# Patient Record
Sex: MALE | Race: WHITE | Employment: OTHER | URBAN - METROPOLITAN AREA
[De-identification: names, ages, dates, MRNs, and addresses within clinical notes are randomized per-mention and may not be internally consistent; named-entity substitution may affect disease eponyms.]

---

## 2017-04-26 ENCOUNTER — HOSPITAL ENCOUNTER (OUTPATIENT)
Dept: PHYSICAL THERAPY | Age: 62
Discharge: HOME | End: 2017-04-26
Payer: COMMERCIAL

## 2017-04-26 PROCEDURE — 97162 PT EVAL MOD COMPLEX 30 MIN: CPT

## 2017-04-26 PROCEDURE — 97016 VASOPNEUMATIC DEVICE THERAPY: CPT

## 2017-04-26 PROCEDURE — 97140 MANUAL THERAPY 1/> REGIONS: CPT

## 2017-04-26 PROCEDURE — 97110 THERAPEUTIC EXERCISES: CPT

## 2017-04-26 NOTE — PROGRESS NOTES
[address] [facility] PHYSICAL THERAPY AT [facility]   [address] [facility] [address] [facility], [place] [address]  Phone: [phone] Fax: [phone] / [address] [place] [address] PHYSICAL THERAPY SERVICES  Patient Name: Janice Godfrey : 1955   Medical   Diagnosis: Acute foot pain, right [M79.671] Treatment Diagnosis: R plantar fasciitis    Onset Date: Dec 2016      Referral Source: Abdelrahman Guy MD Start of Care [facility]): 2017   Prior Hospitalization: See medical history Provider #: [id number]   Prior Level of Function: Functional I - with multiple Musculoskeletal co morbidities    Comorbidities: LS sx, L calcaneal ORIF, RCR    Medications: Verified on Patient Summary List   The Plan of Care and following information is based on the information from the initial evaluation.   ========================================================================  Assessment / key information:  Pt is a 65y.o. year old male who presents with co R heel and foot pain starting in Dec 2016 of unknown reason, but possible ladder use. Pain is progressively worsening. Patient uses arch support and NSAIDs 2x per day for pain relief. Patient attempted to take 3 days off in attempts to alleviate pain, but with no change. Patient has hx LS discectomy in  and L heel ORIF in . .  Current deficits include: increased pain to 10/10 at worst, decreased poor gait quality with decreased heel contact and lateral foot compensation, decreased AROM DF to -1, poor myofascial mobility of posterior chain gastroc, solues and HS, static rear foot IV posturing and 5th met PF, TTP PF Functional deficits include: walking with pain - pushes through pain, work duties: ladder use, Yazidism related activities, rec activities: fishing/boating, walking barefoot . Home exercise program initiated on initial evaluation to address these deficits.  Pt would benefit from PT to address these deficits for increased functional mobility and QOL.  ========================================================================  Eval Complexity: History: HIGH Complexity :3+ comorbidities / personal factors will impact the outcome/ POC Exam:HIGH Complexity : 4+ Standardized tests and measures addressing body structure, function, activity limitation and / or participation in recreation  Presentation: MEDIUM Complexity : Evolving with changing characteristics  Clinical Decision Making:MEDIUM Complexity : FOTO score of 26-74Overall Complexity:MEDIUM  Problem List: pain affecting function, decrease ROM, decrease strength, edema affecting function, impaired gait/ balance, decrease ADL/ functional abilitiies, decrease activity tolerance, decrease flexibility/ joint mobility and other FOTO 48/100   Treatment Plan may include any combination of the following: Therapeutic exercise, Therapeutic activities, Neuromuscular re-education, Physical agent/modality, Gait/balance training, Manual therapy, Aquatic therapy, Patient education, Self Care training, Functional mobility training and Home safety training  Patient / Family readiness to learn indicated by: asking questions, trying to perform skills and interest  Persons(s) to be included in education: patient (P)  Barriers to Learning/Limitations: None  Measures taken:    Patient Goal (s): \"ease pain level \"   Patient self reported health status: excellent  Rehabilitation Potential: good   Short Term Goals: To be accomplished in  1  weeks:  1. Pt will be independent and compliant with HEP   Long Term Goals: To be accomplished in  8-12  treatments:  1. Patient will increase FOTO score to 60/100 for indications of increased functional mobility. 2.  Patient will demo improved gait quality to include proper heel strike and step length for progression to dec compensatory patterns   3. Patient will demo increased AROM DF to 5 deg for progression to normalized gait pattern with community mobility   4.  Patient will demo less than 10 deg rearfoot IV for improved functional mobility of the foot with ADLs / standinf   Frequency / Duration:   Patient to be seen  2  times per week for 8-12  treatments:  Patient / Caregiver education and instruction: self care, activity modification and exercises  G-Codes (GP): ANÍBAL  Therapist Signature: Luis Longoria PT Date: 0/23/6239   Certification Period: na Time: 6:35 AM   ========================================================================  I certify that the above Physical Therapy Services are being furnished while the patient is under my care. I agree with the treatment plan and certify that this therapy is necessary. Physician Signature:        Date:       Time:   Please sign and return to [facility] or you may fax the signed copy to [phone]. Thank you.

## 2017-04-26 NOTE — PROGRESS NOTES
PTANKLE EVAL AND TREATMENT     Patient Name: Horacio Souza  Date:2017  : 1955  [x]  Patient  Verified  Payor: Gerda Preciado / Plan: Tyler Dumont / Product Type: HMO /    In time:902  Out time:950  Total Treatment Time (min): 48  Visit #: 1 of -12    Treatment Area: Acute foot pain, right [M79.671]    SUBJECTIVE  Pain Level (0-10 scale): (C): 6 (B): 4  (W):  10  Any medication changes, allergies to medications, diagnosis change, or new procedure performed?: see summary sheet for update  Subjective functional status/changes:   [] No changes reported  Chief complaint: Patient reports with co R heel and foot pain starting in Dec 2016 of unknown reason, but possible ladder use. Pain is progressively worsening. Patient uses arch support and NSAIDs 2x per day for pain relief. Patient attempted to take 3 days off in attempts to alleviate pain, but with no change. Patient has hx LS discectomy in  and L heel ORIF in .      Deficits: walking with pain - pushes through pain, work duties: ladder use,  Shinto related activities, rec activities: fishing/boating, walking barefoot    Physical Therapy Evaluation  - Foot and Ankle    Gait: antalgic , decreased heel strike , lateral foot contact to avoid heel pressure     ROM/Strength        AROM            Strength (1-5)   Left (HEEL ORIF) Right Left  Right   Dorsiflexion 9 -1 5 5   Plantarflexion WNL WNL      Inversion 28 34     Eversion 18 22     Great Toe Ext PROM WNL  PROM WNL NA NA   Great Toe Flex NA NA NA NA     Flexibility:   Gastroc: R tightness   Soleus: R tightness   HS - 90/90 HS test B tightness     Palpation: TTP PF     Optional Tests: Patient reports hx of poor balance since  (foot surgery)  SLS  Unable     Swelling: none noted     Other tests/ comments:  Severe calc / rear foot IV 16 deg static posture   5th met PF     OBJECTIVE   Manual - 10 min  DTM to PF and ankle IV, PROM into ankle EV and EV mobs, Gastroc stretch     Modality (rationale): decrease acute inflammation  [x]  VASO 10 min     Patient Education: [x] Established HEP    [x] POT   (minutes) : 8    Pain Level (0-10 scale) post treatment: 6    ASSESSMENT  [x]  See Plan of Care    PLAN  [x]  Upgrade activities as tolerated     [x] Other:_  POC 2x8-12    Malekristi Myron, PT 4/26/2017  6:35 AM    Justification for Eval Code Complexity:  Patient History : multiple musculoskeletal co morbidities including LS sx, L calcaneal ORIF, chronicity , unwillingness to rest  Examination see exam   Clinical Presentation: unstable sec to elevated pain levels for 4 mo   Clinical Decision Making : FOTO : 48 /100

## 2017-05-02 ENCOUNTER — HOSPITAL ENCOUNTER (OUTPATIENT)
Dept: PHYSICAL THERAPY | Age: 62
Discharge: HOME | End: 2017-05-02
Payer: COMMERCIAL

## 2017-05-02 PROCEDURE — 97140 MANUAL THERAPY 1/> REGIONS: CPT

## 2017-05-02 PROCEDURE — 97016 VASOPNEUMATIC DEVICE THERAPY: CPT

## 2017-05-02 PROCEDURE — 97110 THERAPEUTIC EXERCISES: CPT

## 2017-05-02 NOTE — PROGRESS NOTES
PT DAILY TREATMENT NOTE     Patient Name: Yuniel Spears  Date:2017  : 1955  [x]  Patient  Verified  Payor: Clotilda Meckel / Plan: Gerardo Petersen / Product Type: HMO /    In time: 8:00am     Out time: 9:00am  Total Treatment Time (min): 60   Visit #: 2 of     Treatment Area: Acute foot pain, right [M79.671]    SUBJECTIVE  Pain Level (0-10 scale): 6  Any medication changes, allergies to medications, adverse drug reactions, diagnosis change, or new procedure performed?: [x] No    [] Yes (see summary sheet for update)  Subjective functional status/changes:   [] No changes reported  \"I have been doing my exercises. I can't walk barefoot. \"    OBJECTIVE  Modality rationale: decrease edema, decrease inflammation and decrease pain to improve the patients ability to perform ADLs   Min Type Additional Details    [] Estim: []Att   []Unatt        []TENS instruct                  []IFC  []Premod   []NMES                     []Other:  []w/US   []w/ice   []w/heat  Position:  Location:    []  Traction: [] Cervical       []Lumbar                       [] Prone          []Supine                       []Intermittent   []Continuous Lbs:  [] before manual  [] after manual    []  Ultrasound: []Continuous   [] Pulsed                           []1MHz   []3MHz Location:  W/cm2:    []  Iontophoresis with dexamethasone         Location: [] Take home patch   [] In clinic    []  Ice     []  heat  []  Ice massage Position:  Location:   10 [x]  Vasopneumatic Device Pressure:       [] lo [x] med [] hi   Temperature: [x] lo [] med [] hi   [x] Skin assessment post-treatment:  [x]intact []redness- no adverse reaction       []redness  adverse reaction:     35 min Therapeutic Exercise:  [x] See flow sheet: initiated therex per IE   Rationale: increase ROM and increase strength to improve the patients ability to return to work duties, climbing ladders    15 min Manual Therapy:  prone DTM/TPR to [address], post-tib, and peroneals; (R) ankle/foot PROM in all directions; manual gastroc stretch   Rationale: decrease pain, increase ROM, increase tissue extensibility and decrease trigger points to improve patient's mobility for a normalized gait pattern          X min Patient Education: [x] Review HEP from IE; sent dry needling request to MD     Other Objective/Functional Measures:    (R) ankle AROM DF with knee extended: 12deg    Pain Level (0-10 scale) post treatment: 0 at rest    ASSESSMENT/Changes in Function:   Patient demo significant improvement in post chain extensibility and inc DF from -1deg at IE to 12deg today. Patient's c/c is cont'd arch pain with (R) LE WB. Patient inquired about use of dry needling to address persistent GSC and posterior tib TrPs. Educated patient that dry needling to posterior chain may be beneficial in reducing tension and encouraged patient to perform ice massage to plantar surface of foot if edema returns with activity. Patient will continue to benefit from skilled PT services to modify and progress therapeutic interventions, address functional mobility deficits, address ROM deficits, address strength deficits, analyze and address soft tissue restrictions, analyze and cue movement patterns and analyze and modify body mechanics/ergonomics to attain remaining goals. [x]  See Plan of Care  []  See progress note/recertification  []  See Discharge Summary         Progress towards goals / Updated goals:  Short Term Goals: To be accomplished in 1 weeks:  1. Pt will be independent and compliant with HEP. -Goal met; strict adherence reported (5/2/17)  Long Term Goals: To be accomplished in 8-12 treatments:  1. Patient will increase FOTO score to 60/100 for indications of increased functional mobility. 2. Patient will demo improved gait quality to include proper heel strike and step length for progression to dec compensatory patterns.  -Goal progressing; rear foot IV posturing continues to to reduce compression/pain on the medial arch (5/2/17)  3. Patient will demo increased AROM DF to 5 deg for progression to normalized gait pattern with community mobility   4. Patient will demo less than 10 deg rearfoot IV for improved functional mobility of the foot with ADLs / standing.     PLAN  [x]  Upgrade activities as tolerated     [x]  Continue plan of care  []  Update interventions per flow sheet       []  Discharge due to:_  [x]  Other: add strap gastroc stretch with EV, foot roller, seated heel slides and seated [address] Susan Bond, \[facility]\"" 5/2/2017

## 2017-05-04 ENCOUNTER — HOSPITAL ENCOUNTER (OUTPATIENT)
Dept: PHYSICAL THERAPY | Age: 62
Discharge: HOME | End: 2017-05-04
Payer: COMMERCIAL

## 2017-05-04 PROCEDURE — 97140 MANUAL THERAPY 1/> REGIONS: CPT

## 2017-05-04 PROCEDURE — 97110 THERAPEUTIC EXERCISES: CPT

## 2017-05-04 PROCEDURE — 97016 VASOPNEUMATIC DEVICE THERAPY: CPT

## 2017-05-04 NOTE — PROGRESS NOTES
PT DAILY TREATMENT NOTE     Patient Name: Ash Hart  Date:2017  : 1955  [x]  Patient  Verified  Payor: Percy Barber / Plan: Cary Valverde / Product Type: HMO /    In time: 9:00am     Out time: 10:12am  Total Treatment Time (min): 72  Visit #: 3 of     Treatment Area: Acute foot pain, right [M79.671]    SUBJECTIVE  Pain Level (0-10 scale): 4  Any medication changes, allergies to medications, adverse drug reactions, diagnosis change, or new procedure performed?: [x] No    [] Yes (see summary sheet for update)  Subjective functional status/changes:   [] No changes reported  \"I am doing okay. I felt it a little at around 2pm last time but I know it was just sore. \"    OBJECTIVE  Modality rationale: decrease edema, decrease inflammation and decrease pain to improve the patients ability to perform ADLs   Min Type Additional Details    [] Estim: []Att   []Unatt        []TENS instruct                  []IFC  []Premod   []NMES                     []Other:  []w/US   []w/ice   []w/heat  Position:  Location:    []  Traction: [] Cervical       []Lumbar                       [] Prone          []Supine                       []Intermittent   []Continuous Lbs:  [] before manual  [] after manual    []  Ultrasound: []Continuous   [] Pulsed                           []1MHz   []3MHz Location:  W/cm2:    []  Iontophoresis with dexamethasone         Location: [] Take home patch   [] In clinic    []  Ice     []  heat  []  Ice massage Position:  Location:   10 [x]  Vasopneumatic Device Pressure:       [] lo [x] med [] hi   Temperature: [x] lo [] med [] hi   [x] Skin assessment post-treatment:  [x]intact []redness- no adverse reaction       []redness  adverse reaction:     39 min Therapeutic Exercise:  [x] See flow sheet: added foot roller   Rationale: increase ROM and increase strength to improve the patients ability to return to work duties, climbing ladders    23 min Manual Therapy:  prone DTM/TPR to [employer], post-tib, and peroneals; (R) ankle/foot PROM in all directions; manual gastroc stretch; 1st-5th ray mobilization   Rationale: decrease pain, increase ROM, increase tissue extensibility and decrease trigger points to improve patient's mobility for a normalized gait pattern          X min Patient Education: [x] Review HEP and purpose for therapeutic interventions; added MSR bunion EO to therex and refaxed dry needling script     Other Objective/Functional Measures:    (R) ankle AROM with knee extended: DF 13deg, IV 20, EV 10, PF 60   Difficulty noted with toe yoga - patient encouraged to cont and expressed understanding of importance of this task to improve strength of intrinsic musculature plantar surface of foot. Pain Level (0-10 scale) post treatment: 0    ASSESSMENT/Changes in Function:   Patient reports last treatment caused normal post-therex soreness. Improved mobility into all directions, will assess whether inc manual time improves carryover with ankle mobility. Significant TrPs and inc in tissue density along medial gastroc and soleus complex limiting ankle EV - able to improve this today. Patient reports difficulty with balance and apprehensive with addition of EO MSR bunion to therex, but able to complete task with ease; patient appropriate for progression towards GT or SR. Improved foot alignment with even positioning of MTHs to heel, however, patient presents with high arch due to prolonged use of arch support. Patient will continue to benefit from skilled PT services to modify and progress therapeutic interventions, address functional mobility deficits, address ROM deficits, address strength deficits, analyze and address soft tissue restrictions, analyze and cue movement patterns and analyze and modify body mechanics/ergonomics to attain remaining goals.      [x]  See Plan of Care  []  See progress note/recertification  []  See Discharge Summary         Progress towards goals / Updated goals:  Short Term Goals: To be accomplished in 1 weeks:  1. Pt will be independent and compliant with HEP. -Goal met; strict adherence reported (5/2/17)  Long Term Goals: To be accomplished in 8-12 treatments:  1. Patient will increase FOTO score to 60/100 for indications of increased functional mobility. 2. Patient will demo improved gait quality to include proper heel strike and step length for progression to dec compensatory patterns. -Goal progressing; rear foot IV posturing continues to to reduce compression/pain on the medial arch (5/2/17)  3. Patient will demo increased AROM DF to 5 deg for progression to normalized gait pattern with community mobility   4. Patient will demo less than 10 deg rearfoot IV for improved functional mobility of the foot with ADLs / standing.     PLAN  [x]  Upgrade activities as tolerated     [x]  Continue plan of care  []  Update interventions per flow sheet       []  Discharge due to:_  [x]  Other: add strap gastroc stretch with EV, seated heel slides and seated [address] [facility], PTA 5/4/2017

## 2017-05-08 ENCOUNTER — HOSPITAL ENCOUNTER (OUTPATIENT)
Dept: PHYSICAL THERAPY | Age: 62
Discharge: HOME | End: 2017-05-08
Payer: COMMERCIAL

## 2017-05-08 PROCEDURE — 97140 MANUAL THERAPY 1/> REGIONS: CPT

## 2017-05-08 PROCEDURE — 97110 THERAPEUTIC EXERCISES: CPT

## 2017-05-08 PROCEDURE — 97016 VASOPNEUMATIC DEVICE THERAPY: CPT

## 2017-05-08 NOTE — PROGRESS NOTES
PT DAILY TREATMENT NOTE     Patient Name: Elida Blackburn  Date:2017  : 1955  [x]  Patient  Verified  Payor: Joe Torres / Plan: Ephriam Haw / Product Type: HMO /    In time:800  Out time: 187  Total Treatment Time (min): 75  Visit #: 4 of     Treatment Area: Acute foot pain, right [M79.671]    SUBJECTIVE  Pain Level (0-10 scale): 4-5  Any medication changes, allergies to medications, adverse drug reactions, diagnosis change, or new procedure performed?: [x] No    [] Yes (see summary sheet for update)  Subjective functional status/changes:   [] No changes reported  \"I think I am making small improvements, but can I get a copy of that note to take to my doctor. \" - patient referring to IMT note     OBJECTIVE  Modality rationale: decrease edema, decrease inflammation and decrease pain to improve the patients ability to perform ADLs   Min Type Additional Details    [] Estim: []Att   []Unatt        []TENS instruct                  []IFC  []Premod   []NMES                     []Other:  []w/US   []w/ice   []w/heat  Position:  Location:    []  Traction: [] Cervical       []Lumbar                       [] Prone          []Supine                       []Intermittent   []Continuous Lbs:  [] before manual  [] after manual    []  Ultrasound: []Continuous   [] Pulsed                           []1MHz   []3MHz Location:  W/cm2:    []  Iontophoresis with dexamethasone         Location: [] Take home patch   [] In clinic    []  Ice     []  heat  []  Ice massage Position:  Location:   10 [x]  Vasopneumatic Device Pressure:       [] lo [x] med [] hi   Temperature: [x] lo [] med [] hi   [x] Skin assessment post-treatment:  [x]intact []redness- no adverse reaction       []redness  adverse reaction:     51 min Therapeutic Exercise:  [x] See flow sheet:    Rationale: increase ROM and increase strength to improve the patients ability to return to work duties, climbing ladders    14 min Manual Therapy:  prone DTM/TPR to [address], post-tib, and peroneals; (R) ankle/foot PROM in all directions, Plantar fascia DTM    Rationale: decrease pain, increase ROM, increase tissue extensibility and decrease trigger points to improve patient's mobility for a normalized gait pattern          X min Patient Education: [x] Review HEP and purpose for therapeutic interventions; added MSR bunion EO to therex and refaxed dry needling script     Other Objective/Functional Measures:    Progressed program to include strap stretch with EV, seated heel slides and seated BAPS for functional ROM     Pain Level (0-10 scale) post treatment: 0    ASSESSMENT/Changes in Function: Patient tolerated all treatment progression well without adverse reaction. Good PROM noted with heel slides into DF. Some dyskinesia with BAPS but improved with practice. Patient cont to with lateral [address] tightness palpated with manual.  Still pending return for verbal order for IMT. Patient will continue to benefit from skilled PT services to modify and progress therapeutic interventions, address functional mobility deficits, address ROM deficits, address strength deficits, analyze and address soft tissue restrictions, analyze and cue movement patterns and analyze and modify body mechanics/ergonomics to attain remaining goals. [x]  See Plan of Care  []  See progress note/recertification  []  See Discharge Summary         Progress towards goals / Updated goals: All goals reviewed and progressing appropriately as of 5/8/2017  Short Term Goals: To be accomplished in 1 weeks:  1. Pt will be independent and compliant with HEP. -Goal met; strict adherence reported (5/2/17)  Long Term Goals: To be accomplished in 8-12 treatments:  1. Patient will increase FOTO score to 60/100 for indications of increased functional mobility. 2. Patient will demo improved gait quality to include proper heel strike and step length for progression to dec compensatory patterns.  -Goal progressing; rear foot IV posturing continues to to reduce compression/pain on the medial arch (5/2/17)  3. Patient will demo increased AROM DF to 5 deg for progression to normalized gait pattern with community mobility   4. Patient will demo less than 10 deg rearfoot IV for improved functional mobility of the foot with ADLs / standing.     PLAN  [x]  Upgrade activities as tolerated     [x]  Continue plan of care  []  Update interventions per flow sheet       []  Discharge due to:_  [x]  Other: pending IMT note - patient request a copy to physically take to MD himself      Evangelista Olson, PT 5/8/2017

## 2017-05-11 ENCOUNTER — APPOINTMENT (OUTPATIENT)
Dept: PHYSICAL THERAPY | Age: 62
End: 2017-05-11
Payer: COMMERCIAL

## 2017-05-15 ENCOUNTER — HOSPITAL ENCOUNTER (OUTPATIENT)
Dept: PHYSICAL THERAPY | Age: 62
Discharge: HOME | End: 2017-05-15
Payer: COMMERCIAL

## 2017-05-15 PROCEDURE — 97016 VASOPNEUMATIC DEVICE THERAPY: CPT

## 2017-05-15 PROCEDURE — 97140 MANUAL THERAPY 1/> REGIONS: CPT

## 2017-05-15 PROCEDURE — 97110 THERAPEUTIC EXERCISES: CPT

## 2017-05-15 NOTE — PROGRESS NOTES
PT DAILY TREATMENT NOTE     Patient Name: Christian Waite  Date:5/15/2017  : 1955  [x]  Patient  Verified  Payor: Jon Montilla / Plan: Thomas Acevedo / Product Type: HMO /    In time: 8:07 Out time:9:16  Total Treatment Time (min): 69  Total Timed Codes (min): 59  1:1 Treatment Time (min): 59   Visit #: 5 of     Treatment Area: Acute foot pain, right [M79.671]    SUBJECTIVE  Pain Level (0-10 scale): 5  Any medication changes, allergies to medications, adverse drug reactions, diagnosis change, or new procedure performed?: [x] No    [] Yes (see summary sheet for update)  Subjective functional status/changes:   [] No changes reported  Pt arrives 7 minutes late, antalgic gait. Agrees to receive IMT today     OBJECTIVE  Modality rationale: decrease edema, decrease inflammation and decrease pain to improve the patients ability to improve mobility for ambulation, stairs, sport    Min Type Additional Details    [] Estim: []Att   []Unatt        []TENS instruct                  []IFC  []Premod   []NMES                     []Other:  []w/US   []w/ice   []w/heat  Position:  Location:    []  Traction: [] Cervical       []Lumbar                       [] Prone          []Supine                       []Intermittent   []Continuous Lbs:  [] before manual  [] after manual    []  Ultrasound: []Continuous   [] Pulsed                           []1MHz   []3MHz Location:  W/cm2:    []  Iontophoresis with dexamethasone         Location: [] Take home patch   [] In clinic   10 vasopneumatic device, med pressure, low temp  Position:  Location:   [] Skin assessment post-treatment:  []intact []redness- no adverse reaction       []redness  adverse reaction:       Dry Needling Procedure Note    Dry Needle Session Number:  1    Procedure:    An intramuscular manual therapy (dry needling) and a neuro-muscular re-education treatment was done to deactivate myofascial trigger points, with a 15/30 gauge solid filament needle, under aseptic technique. Indication(s): [x] Muscle spasms [x] Myalgia/Myositis  [x] Muscle cramps      [x] Muscle imbalances [] TMD (TMJ) [x] Myofascial pain & dysfunction     [] Other: __    Informed Consent Obtained: [x] Verbal  [x] Written    The following items were reviewed with the patient:   Purpose of dry needling, side effects, possible complications, and the informed consent    The need to report the use of blood thinners and/or immunosuppressant medications    How to respond to possible adverse effects of the treatment   Self treatment of post needling soreness: heat (moist heat, heat wraps) and stretching   Opportunity was given to ask any questions, all questions were answered    Time Out Performed to verify correct body part and confirm consent for treatment: 5/15/2017 Time: 8:11    Treatment:  The following muscles were treated today:      Right: Gastroc, soleus, FDL, FDB, Quadratus plantae, adbductor hallucis, abd digiti minimi    Left:      Patients response to todays treatment:   [x]  LTRs  [x]  Muscle Relaxation  []  Pain Relief  []  Decreased Tinnitus  []  Decreased HAs [x]  Post needling soreness [x]  Increased ROM of great toe flexion    []  Other:        40 min Therapeutic Exercise:  [x] See flow sheet :   Rationale: increase ROM, increase strength, improve coordination and improve balance to improve the patients ability to improve gait, stairs, mobility       19 min Manual Therapy:  IMT, see above. DTm to mm needled.  Gastroc/soleus stretch    Rationale: decrease pain, increase ROM, increase tissue extensibility and decrease trigger points to improve gait, pain with work     x min Patient Education: [x] Review HEP    [] Progressed/Changed HEP based on:   [] positioning   [] body mechanics   [] transfers   [] heat/ice application       Post-needling instructions      Other Objective/Functional Measures:     Pt has approx 5 degrees ankle DF prior to treatment with knee flexed   Discussed postural changes. Pt's heel is worse with sitting slumped, improved with upright posture. Discussed use of lumbar roll in the car and while seated. Lifting mechanics. Pain Level (0-10 scale) post treatment: 0    ASSESSMENT/Changes in Function: improvements in gastroc soleus length. Pt demo's improvements in great toe flexion following IMT. Good response to first session of IMT. Patient will continue to benefit from skilled PT services to modify and progress therapeutic interventions, address functional mobility deficits, address ROM deficits, address strength deficits, analyze and address soft tissue restrictions, analyze and cue movement patterns, analyze and modify body mechanics/ergonomics, assess and modify postural abnormalities, address imbalance/dizziness and instruct in home and community integration to attain remaining goals. []  See Plan of Care  []  See progress note/recertification  []  See Discharge Summary         Progress towards goals / Updated goals:  Short Term Goals: To be accomplished in 1 weeks:  1. Pt will be independent and compliant with HEP. -Goal met; strict adherence reported (5/2/17)  Long Term Goals: To be accomplished in 8-12 treatments:  1. Patient will increase FOTO score to 60/100 for indications of increased functional mobility. 2. Patient will demo improved gait quality to include proper heel strike and step length for progression to dec compensatory patterns. -Goal progressing; rear foot IV posturing continues to to reduce compression/pain on the medial arch (5/2/17); con't antalgic gait (5/15/17)  3. Patient will demo increased AROM DF to 5 deg for progression to normalized gait pattern with community mobility  Goal progressing at AROM 5 degrees with knee flexion prior to treatment (5/15/17)  4. Patient will demo less than 10 deg rearfoot IV for improved functional mobility of the foot with ADLs / standing.     PLAN  [x]  Upgrade activities as tolerated     [] Continue plan of care  []  Update interventions per flow sheet       []  Discharge due to:_  [x]  Other:_assess response to first session of IMT      Katiana Hoyos DPT 5/15/2017  7:39 AM

## 2017-05-18 ENCOUNTER — APPOINTMENT (OUTPATIENT)
Dept: PHYSICAL THERAPY | Age: 62
End: 2017-05-18
Payer: COMMERCIAL

## 2017-05-19 ENCOUNTER — HOSPITAL ENCOUNTER (OUTPATIENT)
Dept: PHYSICAL THERAPY | Age: 62
Discharge: HOME | End: 2017-05-19
Payer: COMMERCIAL

## 2017-05-19 PROCEDURE — 97140 MANUAL THERAPY 1/> REGIONS: CPT

## 2017-05-19 PROCEDURE — 97016 VASOPNEUMATIC DEVICE THERAPY: CPT

## 2017-05-19 PROCEDURE — 97110 THERAPEUTIC EXERCISES: CPT

## 2017-05-19 NOTE — PROGRESS NOTES
PT DAILY TREATMENT NOTE     Patient Name: Maple Seip  Date:2017  : 1955  [x]  Patient  Verified  Payor: Chris Lowe / Plan: Yony Moya / Product Type: HMO /    In time: 299 Out time:842  Total Treatment Time (min): 68  Visit #: 6 of     Treatment Area: Acute foot pain, right [M79.671]    SUBJECTIVE  Pain Level (0-10 scale): 7  Any medication changes, allergies to medications, adverse drug reactions, diagnosis change, or new procedure performed?: [x] No    [] Yes (see summary sheet for update)  Subjective functional status/changes:   [] No changes reported  \"I was on a boat yesterday. The dry needling helped I think. \"    OBJECTIVE  Modality rationale: decrease edema, decrease inflammation and decrease pain to improve the patients ability to improve mobility for ambulation, stairs, sport    Min Type Additional Details    [] Estim: []Att   []Unatt        []TENS instruct                  []IFC  []Premod   []NMES                     []Other:  []w/US   []w/ice   []w/heat  Position:  Location:    []  Traction: [] Cervical       []Lumbar                       [] Prone          []Supine                       []Intermittent   []Continuous Lbs:  [] before manual  [] after manual    []  Ultrasound: []Continuous   [] Pulsed                           []1MHz   []3MHz Location:  W/cm2:    []  Iontophoresis with dexamethasone         Location: [] Take home patch   [] In clinic   10 vasopneumatic device, med pressure, low temp  Position:  Location:   [] Skin assessment post-treatment:  []intact []redness- no adverse reaction       []redness  adverse reaction:       Dry Needling Procedure Note    Dry Needle Session Number:  2  Procedure: An intramuscular manual therapy (dry needling) and a neuro-muscular re-education treatment was done to deactivate myofascial trigger points, with a 15/30 gauge solid filament needle, under aseptic technique.     Indication(s): [x] Muscle spasms [x] Myalgia/Myositis  [x] Muscle cramps      [x] Muscle imbalances [] TMD (TMJ) [x] Myofascial pain & dysfunction     [] Other: __    Informed Consent Obtained: [x] Verbal  [x] Written    The following items were reviewed with the patient:   Purpose of dry needling, side effects, possible complications, and the informed consent    The need to report the use of blood thinners and/or immunosuppressant medications    How to respond to possible adverse effects of the treatment   Self treatment of post needling soreness: heat (moist heat, heat wraps) and stretching   Opportunity was given to ask any questions, all questions were answered    Time Out Performed to verify correct body part and confirm consent for treatment: 5/19/2017 Time: 734    Patients response to todays treatment:   [x]  LTRs  [x]  Muscle Relaxation  []  Pain Relief  []  Decreased Tinnitus  []  Decreased HAs [x]  Post needling soreness [x]  Increased ROM of great toe flexion    []  Other:        34 min Therapeutic Exercise:  [x] See flow sheet :   Rationale: increase ROM, increase strength, improve coordination and improve balance to improve the patients ability to improve gait, stairs, mobility     24 min Manual Therapy:  IMT: FDL/B, Quadratus plantae, adductor hallicus , flexor hallicus - multiple locations. Passive EV/DF / GT extension stretch    Rationale: decrease pain, increase ROM, increase tissue extensibility and decrease trigger points to improve gait, pain with work     x min Patient Education: [x] Review HEP       Post-needling instructions      Other Objective/Functional Measures:    Good LTR of medial arch    Gait - antalgic, decreased comfort and decreased WBing R    Pain Level (0-10 scale) post treatment: 4-5/10    ASSESSMENT/Changes in Function: Patient reports first IMT session helped for approx 1 day with pain. Focus therex on arch support and medial arch/ foot stretching.  Standing on boat yesterday requiring balance and stability really aggrevated patients pain. Cont VASO for treatment post treatment soreness. Patient will continue to benefit from skilled PT services to modify and progress therapeutic interventions, address functional mobility deficits, address ROM deficits, address strength deficits, analyze and address soft tissue restrictions, analyze and cue movement patterns, analyze and modify body mechanics/ergonomics, assess and modify postural abnormalities, address imbalance/dizziness and instruct in home and community integration to attain remaining goals. [x]  See Plan of Care  []  See progress note/recertification  []  See Discharge Summary         Progress towards goals / Updated goals:  Short Term Goals: To be accomplished in 1 weeks:  1. Pt will be independent and compliant with HEP. -Goal met; strict adherence reported (5/2/17)  Long Term Goals: To be accomplished in 8-12 treatments:  1. Patient will increase FOTO score to 60/100 for indications of increased functional mobility. 2. Patient will demo improved gait quality to include proper heel strike and step length for progression to dec compensatory patterns. -Goal progressing; rear foot IV posturing continues to to reduce compression/pain on the medial arch (5/2/17); con't antalgic gait (5/15/17)  3. Patient will demo increased AROM DF to 5 deg for progression to normalized gait pattern with community mobility  Goal progressing at AROM 5 degrees with knee flexion prior to treatment (5/15/17)  4. Patient will demo less than 10 deg rearfoot IV for improved functional mobility of the foot with ADLs / standing.     PLAN  [x]  Upgrade activities as tolerated     []  Continue plan of care  []  Update interventions per flow sheet       []  Discharge due to:_  [x]  Other:_cont IMT as tolerated     Mona Torres DPT  5/19/2017

## 2017-05-22 ENCOUNTER — HOSPITAL ENCOUNTER (OUTPATIENT)
Dept: PHYSICAL THERAPY | Age: 62
Discharge: HOME | End: 2017-05-22
Payer: COMMERCIAL

## 2017-05-22 PROCEDURE — 97110 THERAPEUTIC EXERCISES: CPT

## 2017-05-22 PROCEDURE — 97016 VASOPNEUMATIC DEVICE THERAPY: CPT

## 2017-05-22 PROCEDURE — 97140 MANUAL THERAPY 1/> REGIONS: CPT

## 2017-05-22 NOTE — PROGRESS NOTES
PT DAILY TREATMENT NOTE     Patient Name: Julia Barros  Date:2017  : 1955  [x]  Patient  Verified  Payor: Karissa Strong / Plan: Rollo Filter / Product Type: HMO /    In time:7:55  Out time:9:09  Total Treatment Time (min): 74  Total Timed Codes (min): 64  1:1 Treatment Time (min): 64   Visit #: 7 of     Treatment Area: Acute foot pain, right [M79.671]    SUBJECTIVE  Pain Level (0-10 scale): 3-4  Any medication changes, allergies to medications, adverse drug reactions, diagnosis change, or new procedure performed?: [x] No    [] Yes (see summary sheet for update)  Subjective functional status/changes:   [] No changes reported  I was great on Friday and Saturday morning after treatment, but then I moved furniture up and down stairs and that did me in. I have been doing some of the back bends and notice some tightness in the back of my thigh. Pt ambulates in with reduced antalgic gait, approx 25% vs typical antalgic pattern.  Notes, \"Today is a good day\"    OBJECTIVE  Modality rationale: decrease edema, decrease inflammation and decrease pain to improve the patients ability to improve gait, stairs, mobility    Min Type Additional Details    [] Estim: []Att   []Unatt        []TENS instruct                  []IFC  []Premod   []NMES                     []Other:  []w/US   []w/ice   []w/heat  Position:  Location:    []  Traction: [] Cervical       []Lumbar                       [] Prone          []Supine                       []Intermittent   []Continuous Lbs:  [] before manual  [] after manual    []  Ultrasound: []Continuous   [] Pulsed                           []1MHz   []3MHz Location:  W/cm2:    []  Iontophoresis with dexamethasone         Location: [] Take home patch   [] In clinic   10 Vaso: med pressure, low temp Position:  Location:   [] Skin assessment post-treatment:  []intact []redness- no adverse reaction       []redness  adverse reaction:       Dry Needling Procedure Note    Dry Needle Session Number:  3    Procedure: An intramuscular manual therapy (dry needling) and a neuro-muscular re-education treatment was done to deactivate myofascial trigger points, with a 15/30 gauge solid filament needle, under aseptic technique. Indication(s): [x] Muscle spasms [x] Myalgia/Myositis  [x] Muscle cramps      [x] Muscle imbalances [] TMD (TMJ) [x] Myofascial pain & dysfunction     [] Other: __    Informed Consent Obtained: [x] Verbal  [] Written    The following items were reviewed with the patient:   Purpose of dry needling, side effects, possible complications, and the informed consent    The need to report the use of blood thinners and/or immunosuppressant medications    How to respond to possible adverse effects of the treatment   Self treatment of post needling soreness: heat (moist heat, heat wraps) and stretching   Opportunity was given to ask any questions, all questions were answered    Time Out Performed to verify correct body part and confirm consent for treatment: 5/22/2017 Time: 8:00    Treatment:  The following muscles were treated today:      Right: abd hallucis, abd digiti minimi, gastroc, soleus, quadratus plantae   Left:      Patients response to todays treatment:   []  LTRs  []  Muscle Relaxation  []  Pain Relief  []  Decreased Tinnitus  []  Decreased HAs []  Post needling soreness []  Increased ROM   []  Other:        46 min Therapeutic Exercise:  [x] See flow sheet : weight shifting in stance to improve toe off on the R foot;    Rationale: increase ROM, increase strength, improve coordination, improve balance and increase proprioception to improve the patients ability to improve gait, work tolerance     18 min Manual Therapy:  IMT, see above. Stretching to gastroc/soleus, great toe extension, DTM to mm needled.  Calc eversion mobs in R SL grade IV    Rationale: decrease pain, increase ROM, increase tissue extensibility and decrease trigger points to improve mobility for stairs, work, gait     x min Patient Education: [x] Review HEP    [] Progressed/Changed HEP based on:   [] positioning   [] body mechanics   [] transfers   [] heat/ice application       Weight shifting onto the great toe      Other Objective/Functional Measures:     R ankle AROM: DF with knee ext 6, PF 24, inversion 30, eversion  9  Gait after MT: emphasized toe off on the R LE with fair to good success and no increase in pain with more normalized gait pattern. Stance; too many toes sign on the R, WNL calc eversion. .       Pain Level (0-10 scale) post treatment: 0    ASSESSMENT/Changes in Function: Pt can demo more normalized gait pattern after MT today without increased pain. Overall increased 7 degrees of DF up to 6 from neutral now, which demo's improved prognosis for gait and stairs, but con't to lack normal ROM for full pain-free functional abilities. Pt with minimal TrP in the medial soleus (1 LTR elicited) and improving in the foot intrinsics as well. Point tender over the abd digiti minimi with good response to IMT. Pt with normal calcaneal eversion in stance    Patient will continue to benefit from skilled PT services to modify and progress therapeutic interventions, address functional mobility deficits, address ROM deficits, address strength deficits, analyze and address soft tissue restrictions, analyze and cue movement patterns, analyze and modify body mechanics/ergonomics, assess and modify postural abnormalities, address imbalance/dizziness and instruct in home and community integration to attain remaining goals. []  See Plan of Care  []  See progress note/recertification  []  See Discharge Summary         Progress towards goals / Updated goals:  Short Term Goals: To be accomplished in 1 weeks:  1. Pt will be independent and compliant with HEP. -Goal met; strict adherence reported (5/2/17)  Long Term Goals: To be accomplished in 8-12 treatments:  1.  Patient will increase FOTO score to 60/100 for indications of increased functional mobility. 2. Patient will demo improved gait quality to include proper heel strike and step length for progression to dec compensatory patterns. -Goal progressed today with improved toe off (5/22/17)  3. Patient will demo increased AROM DF to 5 deg for progression to normalized gait pattern with community mobility Goal progressing at 6 degrees (5/22/17)  4. Patient will demo less than 10 deg rearfoot IV for improved functional mobility of the foot with ADLs / standing.  Goal progressing, improved eversion in [address] (5/22/17)    PLAN  [x]  Upgrade activities as tolerated     []  Continue plan of care  []  Update interventions per flow sheet       []  Discharge due to:_  [x]  Other:_  COn't IMT as tolerated, PN approaching on 5/26/17    Temitope Babin DPT 5/22/2017  7:20 AM

## 2017-05-23 ENCOUNTER — APPOINTMENT (OUTPATIENT)
Dept: PHYSICAL THERAPY | Age: 62
End: 2017-05-23
Payer: COMMERCIAL

## 2017-05-25 ENCOUNTER — APPOINTMENT (OUTPATIENT)
Dept: PHYSICAL THERAPY | Age: 62
End: 2017-05-25
Payer: COMMERCIAL

## 2017-05-26 ENCOUNTER — HOSPITAL ENCOUNTER (OUTPATIENT)
Dept: PHYSICAL THERAPY | Age: 62
Discharge: HOME | End: 2017-05-26
Payer: COMMERCIAL

## 2017-05-26 PROCEDURE — 97110 THERAPEUTIC EXERCISES: CPT

## 2017-05-26 PROCEDURE — 97140 MANUAL THERAPY 1/> REGIONS: CPT

## 2017-05-26 PROCEDURE — 97016 VASOPNEUMATIC DEVICE THERAPY: CPT

## 2017-05-26 NOTE — PROGRESS NOTES
PT DAILY TREATMENT NOTE     Patient Name: Ash Hart  Date:2017  : 1955  [x]  Patient  Verified  Payor: Percy Barber / Plan: Cary Valverde / Product Type: HMO /    In time:730  Out time:855  Total Treatment Time (min): 85  Visit #: 8 of     Treatment Area: Acute foot pain, right [M79.671]    SUBJECTIVE  Pain Level (0-10 scale): 4-5  Any medication changes, allergies to medications, adverse drug reactions, diagnosis change, or new procedure performed?: [x] No    [] Yes (see summary sheet for update)  Subjective functional status/changes:   [] No changes reported  \"Its helping but I am still having issues. Tuesday was bad because I stood all day. Wed and Thursday were ok. \"    OBJECTIVE  Modality rationale: decrease edema, decrease inflammation and decrease pain to improve the patients ability to improve gait, stairs, mobility    Min Type Additional Details    [] Estim: []Att   []Unatt        []TENS instruct                  []IFC  []Premod   []NMES                     []Other:  []w/US   []w/ice   []w/heat  Position:  Location:    []  Traction: [] Cervical       []Lumbar                       [] Prone          []Supine                       []Intermittent   []Continuous Lbs:  [] before manual  [] after manual    []  Ultrasound: []Continuous   [] Pulsed                           []1MHz   []3MHz Location:  W/cm2:    []  Iontophoresis with dexamethasone         Location: [] Take home patch   [] In clinic   10 Vaso: med pressure, low temp Position:  Location:   [x] Skin assessment post-treatment:  [x]intact []redness- no adverse reaction       []redness  adverse reaction:       Dry Needling Procedure Note    Dry Needle Session Number:  4    Procedure: An intramuscular manual therapy (dry needling) and a neuro-muscular re-education treatment was done to deactivate myofascial trigger points, with a 15/30 gauge solid filament needle, under aseptic technique.     Indication(s): [x] Muscle spasms [x] Myalgia/Myositis  [x] Muscle cramps      [x] Muscle imbalances [] TMD (TMJ) [x] Myofascial pain & dysfunction     [] Other: __    Informed Consent Obtained: [x] Verbal  [] Written    The following items were reviewed with the patient:   Purpose of dry needling, side effects, possible complications, and the informed consent    The need to report the use of blood thinners and/or immunosuppressant medications    How to respond to possible adverse effects of the treatment   Self treatment of post needling soreness: heat (moist heat, heat wraps) and stretching   Opportunity was given to ask any questions, all questions were answered    Time Out Performed to verify correct body part and confirm consent for treatment: 5/26/2017 Time: 730      Patients response to todays treatment:   [x]  LTRs  []  Muscle Relaxation  []  Pain Relief  []  Decreased Tinnitus  []  Decreased HAs [x]  Post needling soreness [x]  Increased ROM   []  Other:        50 min Therapeutic Exercise:  [x] See flow sheet :     Rationale: increase ROM, increase strength, improve coordination, improve balance and increase proprioception to improve the patients ability to improve gait, work tolerance     25 min Manual Therapy:  IMT quadratus plantae, posterior calcaneous, PROM/ joint mobs for  DF and IV, REASSESS   Rationale: decrease pain, increase ROM, increase tissue extensibility and decrease trigger points to improve mobility for stairs, work, gait     x min Patient Education: [x] Review HEP  - Discussed shoe options        Other Objective/Functional Measures:    Goals assessed for continuation   Pain at best 3/10, at worst 8-9/10  Subjective % improvement 60%  Objective:    AROM DF 4deg, rearfoot IV 8 static (16 at IE ),  Rearfoot dynamic EV 6   SLS 2-3 sec - able to tolerate FWBing on R LE    Gait : decreased antalgia, improved heel strike   Improvements: improved flexibility, slight improvement in pain levels  Deficits : prolonged standing/ being on feet, staying away from ladder    Pain Level (0-10 scale) post treatment: 0    ASSESSMENT/Changes in Function: SEE PN     Patient will continue to benefit from skilled PT services to modify and progress therapeutic interventions, address functional mobility deficits, address ROM deficits, address strength deficits, analyze and address soft tissue restrictions, analyze and cue movement patterns, analyze and modify body mechanics/ergonomics, assess and modify postural abnormalities, address imbalance/dizziness and instruct in home and community integration to attain remaining goals. []  See Plan of Care  [x]  See progress note/recertification 5/14/74  []  See Discharge Summary         Progress towards goals / Updated goals:  Short Term Goals: To be accomplished in 1 weeks:  1. Pt will be independent and compliant with HEP. -Goal met; strict adherence reported   Long Term Goals: To be accomplished in 8-12 treatments:  1. Patient will increase FOTO score to 60/100 for indications of increased functional mobility goal progressing - increased 2 pts from IE to 50/100  2. Patient will demo improved gait quality to include proper heel strike and step length for progression to dec compensatory patterns. -Goal progressing - decreased antalgia and improved heel strike  3. Patient will demo increased AROM DF to 5 deg for progression to normalized gait pattern with community mobility Goal progressing - improved from -1 to +4   4. Patient will demo less than 10 deg rearfoot IV for improved functional mobility of the foot with ADLs / standing.  Goal met at 8 deg rearfoot IV     PLAN  [x]  Upgrade activities as tolerated     []  Continue plan of care  []  Update interventions per flow sheet       []  Discharge due to:_  [x]  Other:_  SEE PN - cont as recommended     Nalini Beckford DPT 5/26/2017

## 2017-05-26 NOTE — PROGRESS NOTES
[address] [facility] [address] [facility] AT [address]. [place] [address] [place] [address]  Phone: [phone] Fax: [phone]  PROGRESS NOTE  Patient Name: Laz Rizzo : 1955   Treatment/Medical Diagnosis: Acute foot pain, right [M79.671]   Referral Source: Monse Randolph MD     Date of Initial Visit: 17 Attended Visits: 8 Missed Visits: 0     SUMMARY OF TREATMENT  Patient is being treated for R foot plantar fasciitis. Treatment has included IMT/ dry needling, therex, gait training and anti inflam modalities for pain relief. CURRENT STATUS  Patient is making fair to good progress with PT. IMT/ dry needling has been beneficial in treatment and patient reports full compliance with HEP. Other assessment as follows:   Pain at best 3/10, at worst 8-9/10  Subjective % improvement 60%  Objective:   AROM DF 4deg, rearfoot IV 8 static (16 at IE ), Rearfoot dynamic EV 6  SLS 2-3 sec - able to tolerate FWBing on R LE   Gait : decreased antalgia, improved heel strike   Improvements: improved flexibility, slight improvement in pain levels  Deficits : prolonged standing/ being on feet, staying away from ladder  Progress towards goals   Short Term Goals: To be accomplished in 1 weeks:  1. Pt will be independent and compliant with HEP. -Goal met; strict adherence reported   Long Term Goals: To be accomplished in 8-12 treatments:  1. Patient will increase FOTO score to 60/100 for indications of increased functional mobility goal progressing - increased 2 pts from IE to 50/100  2. Patient will demo improved gait quality to include proper heel strike and step length for progression to dec compensatory patterns. -Goal progressing - decreased antalgia and improved heel strike  3. Patient will demo increased AROM DF to 5 deg for progression to normalized gait pattern with community mobility Goal progressing - improved from -1 to +4   4.  Patient will demo less than 10 deg rearfoot IV for improved functional mobility of the foot with ADLs / standing. Goal met at 8 deg rearfoot IV   New Goals to be achieved in __8-12__  treatments:  Cont goals 1-3  As above     G-Codes: NA  RECOMMENDATIONS  Patient would benefit from continuation of skilled PT to address above mentioned deficits to progress to increased activity tolerance and QOL. Cont 2 times per week for 8-12 treatments as needed. If you have any questions/comments please contact us directly at [phone]. Thank you for allowing us to assist in the care of your patient. Therapist Signature: Steffi Butts PT Date: 5/26/2017     Time: 928 am    NOTE TO PHYSICIAN:  PLEASE COMPLETE THE ORDERS BELOW AND FAX TO   [facility]: [phone]. If you are unable to process this request in 24 hours please contact our office: [phone].  ___ I have read the above report and request that my patient continue as recommended.   ___ I have read the above report and request that my patient continue therapy with the following changes/special instructions:_________________________________________________________   ___ I have read the above report and request that my patient be discharged from therapy.      Physician Signature:        Date:       Time:

## 2017-05-31 ENCOUNTER — HOSPITAL ENCOUNTER (OUTPATIENT)
Dept: PHYSICAL THERAPY | Age: 62
Discharge: HOME | End: 2017-05-31
Payer: COMMERCIAL

## 2017-05-31 PROCEDURE — 97110 THERAPEUTIC EXERCISES: CPT

## 2017-05-31 PROCEDURE — 97140 MANUAL THERAPY 1/> REGIONS: CPT

## 2017-05-31 PROCEDURE — 97016 VASOPNEUMATIC DEVICE THERAPY: CPT

## 2017-05-31 NOTE — PROGRESS NOTES
PT DAILY TREATMENT NOTE     Patient Name: Yuniel Obandoty  Date:2017  : 1955  [x]  Patient  Verified  Payor: Clotilda Meckel / Plan: Gerardo Petersen / Product Type: HMO /    In time:651 Out time:806  Total Treatment Time (min): 75  Visit #: 1 of     Treatment Area: Acute foot pain, right [M79.671]    SUBJECTIVE  Pain Level (0-10 scale): 5  Any medication changes, allergies to medications, adverse drug reactions, diagnosis change, or new procedure performed?: [x] No    [] Yes (see summary sheet for update)  Subjective functional status/changes:   [] No changes reported  \"This morning is better, last night I wanted to cut it off. I had an active weekend and I paid for it. \"    OBJECTIVE  Modality rationale: decrease edema, decrease inflammation and decrease pain to improve the patients ability to improve gait, stairs, mobility    Min Type Additional Details    [] Estim: []Att   []Unatt        []TENS instruct                  []IFC  []Premod   []NMES                     []Other:  []w/US   []w/ice   []w/heat  Position:  Location:    []  Traction: [] Cervical       []Lumbar                       [] Prone          []Supine                       []Intermittent   []Continuous Lbs:  [] before manual  [] after manual    []  Ultrasound: []Continuous   [] Pulsed                           []1MHz   []3MHz Location:  W/cm2:    []  Iontophoresis with dexamethasone         Location: [] Take home patch   [] In clinic   10 Vaso: med pressure, low temp Position:  Location:   [x] Skin assessment post-treatment:  [x]intact []redness- no adverse reaction       []redness  adverse reaction:       Dry Needling Procedure Note    Dry Needle Session Number:  5    Procedure: An intramuscular manual therapy (dry needling) and a neuro-muscular re-education treatment was done to deactivate myofascial trigger points, with a 15/30 gauge solid filament needle, under aseptic technique.     Indication(s): [x] Muscle spasms [x] Myalgia/Myositis  [x] Muscle cramps      [x] Muscle imbalances [] TMD (TMJ) [x] Myofascial pain & dysfunction     [] Other: __    Informed Consent Obtained: [x] Verbal  [] Written    The following items were reviewed with the patient:   Purpose of dry needling, side effects, possible complications, and the informed consent    The need to report the use of blood thinners and/or immunosuppressant medications    How to respond to possible adverse effects of the treatment   Self treatment of post needling soreness: heat (moist heat, heat wraps) and stretching   Opportunity was given to ask any questions, all questions were answered    Time Out Performed to verify correct body part and confirm consent for treatment: 5/31/2017 Time: 655am      Patients response to todays treatment:   [x]  LTRs  []  Muscle Relaxation  []  Pain Relief  []  Decreased Tinnitus  []  Decreased HAs [x]  Post needling soreness [x]  Increased ROM   []  Other:        45 min Therapeutic Exercise:  [x] See flow sheet :     Rationale: increase ROM, increase strength, improve coordination, improve balance and increase proprioception to improve the patients ability to improve gait, work tolerance     20 min Manual Therapy:  IMT B gastroc , soleus , posterior tib, roller to post   Rationale: decrease pain, increase ROM, increase tissue extensibility and decrease trigger points to improve mobility for stairs, work, gait     x min Patient Education: [x] Review HEP - updated HEP pic and encouraged plantar fascia night splint        Other Objective/Functional Measures:    Progressed to more aggressive standing therex to promote functional WBing strength   Gait - shortened step length L to avoid ankle DF and Plantar Fascia elongation - improved after IMT     Pain Level (0-10 scale) post treatment: 0    ASSESSMENT/Changes in Function: Patient tolerated treatment progression good, however had to use II bars for toe walking sec to poor balance.  Pt reports trying tennis shoes with no noticeable difference, however patient was very very active on the boat and at home. Excellent LTR of larger calf muscles today with improved static positioning after treatment. Patient will continue to benefit from skilled PT services to modify and progress therapeutic interventions, address functional mobility deficits, address ROM deficits, address strength deficits, analyze and address soft tissue restrictions, analyze and cue movement patterns, analyze and modify body mechanics/ergonomics, assess and modify postural abnormalities, address imbalance/dizziness and instruct in home and community integration to attain remaining goals. []  See Plan of Care  [x]  See progress note/recertification 8/76/04  []  See Discharge Summary         Progress towards goals / Updated goals: PN completed last session - cont per unmet goals 5/31/17  Long Term Goals: To be accomplished in 8-12 treatments:  1. Patient will increase FOTO score to 60/100 for indications of increased functional mobility goal progressing - increased 2 pts from IE to 50/100  2. Patient will demo improved gait quality to include proper heel strike and step length for progression to dec compensatory patterns. -Goal progressing - decreased antalgia and improved heel strike  3.  Patient will demo increased AROM DF to 5 deg for progression to normalized gait pattern with community mobility Goal progressing - improved from -1 to +4     PLAN  [x]  Upgrade activities as tolerated     []  Continue plan of care  []  Update interventions per flow sheet       []  Discharge due to:_  [x]  Other:_  Cont to progress [address]     Socrates Obed DPT 5/31/2017

## 2017-06-05 ENCOUNTER — HOSPITAL ENCOUNTER (OUTPATIENT)
Dept: PHYSICAL THERAPY | Age: 62
Discharge: HOME | End: 2017-06-05
Payer: COMMERCIAL

## 2017-06-05 PROCEDURE — 97110 THERAPEUTIC EXERCISES: CPT

## 2017-06-05 PROCEDURE — 97016 VASOPNEUMATIC DEVICE THERAPY: CPT

## 2017-06-05 PROCEDURE — 97140 MANUAL THERAPY 1/> REGIONS: CPT

## 2017-06-05 NOTE — PROGRESS NOTES
PT DAILY TREATMENT NOTE     Patient Name: Car Henry  Date:2017  : 1955  [x]  Patient  Verified  Payor: Umesh Sportsman / Plan: Arik Mood / Product Type: HMO /    In time:9:31  Out time:10:45  Total Treatment Time (min): 74  Total Timed Codes (min): 64  1:1 Treatment Time (min): 64   Visit #: 2 of     Treatment Area: Acute foot pain, right [M79.671]    SUBJECTIVE  Pain Level (0-10 scale): 2  Any medication changes, allergies to medications, adverse drug reactions, diagnosis change, or new procedure performed?: [x] No    [] Yes (see summary sheet for update)  Subjective functional status/changes:   [] No changes reported  \"doing much better. The needling to the upper calf seemed to do wel. I have had low pain levels for about 5 days. I could be up and moving more\"      OBJECTIVE  Modality rationale: decrease edema, decrease inflammation and decrease pain to improve the patients ability to improve mobility, work tolerance    Min Type Additional Details    [] Estim: []Att   []Unatt        []TENS instruct                  []IFC  []Premod   []NMES                     []Other:  []w/US   []w/ice   []w/heat  Position:  Location:    []  Traction: [] Cervical       []Lumbar                       [] Prone          []Supine                       []Intermittent   []Continuous Lbs:  [] before manual  [] after manual    []  Ultrasound: []Continuous   [] Pulsed                           []1MHz   []3MHz Location:  W/cm2:    []  Iontophoresis with dexamethasone         Location: [] Take home patch   [] In clinic   10 X vasopneumatic device Position: long sitting  Location: med pressure, low temp to the R foot/ankle   [x] Skin assessment post-treatment:  [x]intact []redness- no adverse reaction       []redness - adverse reaction:       Dry Needling Procedure Note    Dry Needle Session Number:  6    Procedure:    An intramuscular manual therapy (dry needling) and a neuro-muscular re-education treatment was done to deactivate myofascial trigger points, with a 15/30 gauge solid filament needle, under aseptic technique. Indication(s): [x] Muscle spasms [x] Myalgia/Myositis  [] Muscle cramps      [x] Muscle imbalances [] TMD (TMJ) [] Myofascial pain & dysfunction     [] Other: __    Informed Consent Obtained: [x] Verbal  [] Written    The following items were reviewed with the patient:   Purpose of dry needling, side effects, possible complications, and the informed consent    The need to report the use of blood thinners and/or immunosuppressant medications    How to respond to possible adverse effects of the treatment   Self treatment of post needling soreness: heat (moist heat, heat wraps) and stretching   Opportunity was given to ask any questions, all questions were answered    Time Out Performed to verify correct body part and confirm consent for treatment: 6/5/2017 Time: 9:45    Treatment:  The following muscles were treated today:      Right: Proximal and mid G/S. abd digiti minimi, QP   Left:      Patients response to todays treatment:   [x]  LTRs  []  Muscle Relaxation  []  Pain Relief  []  Decreased Tinnitus  []  Decreased HAs []  Post needling soreness []  Increased ROM   []  Other:        44 min Therapeutic Exercise:  [] See flow sheet :   Rationale: increase ROM, increase strength, improve coordination, improve balance and increase proprioception to improve the patients ability to improve gait, mobility, work       20 min Manual Therapy:  Needling see above, DTM to calf and peroneals. PROM R ankle.     Rationale: decrease pain, increase ROM, increase tissue extensibility and decrease trigger points to improve gait, work, mobility     x min Patient Education: [x] Review HEP    [] Progressed/Changed HEP based on:   [] positioning   [] body mechanics   [] transfers   [] heat/ice application        Other Objective/Functional Measures:   Min bruising of the proximal medial gastroc from last IMT sessions; avoided that area. Pain Level (0-10 scale) post treatment: 0    ASSESSMENT/Changes in Function: Pt has LTR's throughout the Gastroc/soleus. No TrP in the foot intrinsics. Improved gait pattern and tolerance to standing TE. Pt has approx 2-3\" of heel height with toe walking. Patient will continue to benefit from skilled PT services to modify and progress therapeutic interventions, address functional mobility deficits, address ROM deficits, address strength deficits, analyze and address soft tissue restrictions, analyze and cue movement patterns, analyze and modify body mechanics/ergonomics, assess and modify postural abnormalities, address imbalance/dizziness and instruct in home and community integration to attain remaining goals. []  See Plan of Care  []  See progress note/recertification  []  See Discharge Summary         Progress towards goals / Updated goals:  Long Term Goals: To be accomplished in 8-12 treatments:  1. Patient will increase FOTO score to 60/100 for indications of increased functional mobility goal progressing - increased 2 pts from IE to 50/100  2. Patient will demo improved gait quality to include proper heel strike and step length for progression to dec compensatory patterns. -Goal progressing - decreased antalgia and improved heel strike (6/5/17)  3.  Patient will demo increased AROM DF to 5 deg for progression to normalized gait pattern with community mobility Goal progressing - improved from -1 to +4     PLAN  [x]  Upgrade activities as tolerated     [x]  Continue plan of care  []  Update interventions per flow sheet       []  Discharge due to:_  [x]  Other:_  Con't IMT of the gastroc/soleus     Yelitza Merchant DPT 6/5/2017  7:38 AM

## 2017-06-14 ENCOUNTER — HOSPITAL ENCOUNTER (OUTPATIENT)
Dept: PHYSICAL THERAPY | Age: 62
Discharge: HOME | End: 2017-06-14
Payer: COMMERCIAL

## 2017-06-14 PROCEDURE — 97110 THERAPEUTIC EXERCISES: CPT

## 2017-06-14 PROCEDURE — 97016 VASOPNEUMATIC DEVICE THERAPY: CPT

## 2017-06-14 PROCEDURE — 97140 MANUAL THERAPY 1/> REGIONS: CPT

## 2017-06-14 NOTE — PROGRESS NOTES
PT DAILY TREATMENT NOTE     Patient Name: Yuniel Spears  Date:2017  : 1955  [x]  Patient  Verified  Payor: Clotilda Meckel / Plan: Gerardo Petersen / Product Type: HMO /    In time:652  Out time:810  Total Treatment Time (min): 78  Visit #: 3 of     Treatment Area: Acute foot pain, right [M79.671]    SUBJECTIVE  Pain Level (0-10 scale): 5-6  Any medication changes, allergies to medications, adverse drug reactions, diagnosis change, or new procedure performed?: [x] No    [] Yes (see summary sheet for update)  Subjective functional status/changes:   [] No changes reported  \"I had a hard weekend and I think we overdid it with the toe walking last time. \"    OBJECTIVE  Modality rationale: decrease edema, decrease inflammation and decrease pain to improve the patients ability to improve mobility, work tolerance    Min Type Additional Details    [] Estim: []Att   []Unatt        []TENS instruct                  []IFC  []Premod   []NMES                     []Other:  []w/US   []w/ice   []w/heat  Position:  Location:    []  Traction: [] Cervical       []Lumbar                       [] Prone          []Supine                       []Intermittent   []Continuous Lbs:  [] before manual  [] after manual    []  Ultrasound: []Continuous   [] Pulsed                           []1MHz   []3MHz Location:  W/cm2:    []  Iontophoresis with dexamethasone         Location: [] Take home patch   [] In clinic   10 X vasopneumatic device Position: long sitting  Location: med pressure, low temp to the R foot/ankle   [x] Skin assessment post-treatment:  [x]intact []redness- no adverse reaction       []redness - adverse reaction:       Dry Needling Procedure Note    Dry Needle Session Number:  7    Procedure:    An intramuscular manual therapy (dry needling) and a neuro-muscular re-education treatment was done to deactivate myofascial trigger points, with a 15/30 gauge solid filament needle, under aseptic technique. Indication(s): [x] Muscle spasms [x] Myalgia/Myositis  [] Muscle cramps      [x] Muscle imbalances [] TMD (TMJ) [] Myofascial pain & dysfunction     [] Other: __    Informed Consent Obtained: [x] Verbal  [] Written    The following items were reviewed with the patient:   Purpose of dry needling, side effects, possible complications, and the informed consent    The need to report the use of blood thinners and/or immunosuppressant medications    How to respond to possible adverse effects of the treatment   Self treatment of post needling soreness: heat (moist heat, heat wraps) and stretching   Opportunity was given to ask any questions, all questions were answered    Time Out Performed to verify correct body part and confirm consent for treatment: 6/14/2017 Time: 652    Patients response to todays treatment:   [x]  LTRs  []  Muscle Relaxation  []  Pain Relief  []  Decreased Tinnitus  []  Decreased HAs [x]  Post needling soreness []  Increased ROM   []  Other:        47 min Therapeutic Exercise:  [x] See flow sheet : 2 units charged sec to mod I    Rationale: increase ROM, increase strength, improve coordination, improve balance and increase proprioception to improve the patients ability to improve gait, mobility, work       21 min Manual Therapy:  IMT : B GSC multiple locations, DTM to calf and peroneals. Achilles manual stretch   Rationale: decrease pain, increase ROM, increase tissue extensibility and decrease trigger points to improve gait, work, mobility     x min Patient Education: [x] Review HEP  - post IMT care      Other Objective/Functional Measures:    Increased knee flexion ROM noted with soleus stretch without heel raise    LTG 2 assessed     Pain Level (0-10 scale) post treatment: 5    ASSESSMENT/Changes in Function: Pt with increased pain levels today and attributes it to increased toe walking and high activity weekend. Patient continues to alternate shoes for additional support. Patient co \"burning\" at calcaneous close to achilles attachment. Attempted manual elongation/ stretching of achilles. Patient will continue to benefit from skilled PT services to modify and progress therapeutic interventions, address functional mobility deficits, address ROM deficits, address strength deficits, analyze and address soft tissue restrictions, analyze and cue movement patterns, analyze and modify body mechanics/ergonomics, assess and modify postural abnormalities, address imbalance/dizziness and instruct in home and community integration to attain remaining goals. []  See Plan of Care  [x]  See progress note/recertification 9/97/45  []  See Discharge Summary         Progress towards goals / Updated goals:  Long Term Goals: To be accomplished in 8-12 treatments:  1. Patient will increase FOTO score to 60/100 for indications of increased functional mobility goal progressing - increased 2 pts from IE to 50/100  2. Patient will demo improved gait quality to include proper heel strike and step length for progression to dec compensatory patterns. -Goal progressing - significantly improved from IE as patient is now able to heel strike, but with continued antalgia and decreased comfort  6/14/17  3. Patient will demo increased AROM DF to 5 deg for progression to normalized gait pattern with community mobility Goal progressing - improved from -1 to +4  6/5/17    PLAN  [x]  Upgrade activities as tolerated     [x]  Continue plan of care  []  Update interventions per flow sheet       []  Discharge due to:_  [x]  Other:_  Taping to deactivate achilles?     Roxy Miller DPT  6/14/2017

## 2017-06-16 ENCOUNTER — HOSPITAL ENCOUNTER (OUTPATIENT)
Dept: PHYSICAL THERAPY | Age: 62
Discharge: HOME | End: 2017-06-16
Payer: COMMERCIAL

## 2017-06-16 PROCEDURE — 97140 MANUAL THERAPY 1/> REGIONS: CPT

## 2017-06-16 PROCEDURE — 97016 VASOPNEUMATIC DEVICE THERAPY: CPT

## 2017-06-16 PROCEDURE — 97110 THERAPEUTIC EXERCISES: CPT

## 2017-06-16 NOTE — PROGRESS NOTES
PT DAILY TREATMENT NOTE     Patient Name: Dorie Dick  Date:2017  : 1955  [x]  Patient  Verified  Payor: Claudia Mercado / Plan: Nakul Plummer / Product Type: HMO /    In time:653 Out time:800  Total Treatment Time (min): 67  Visit #: 4 of     Treatment Area: Acute foot pain, right [M79.671]    SUBJECTIVE  Pain Level (0-10 scale): 4  Any medication changes, allergies to medications, adverse drug reactions, diagnosis change, or new procedure performed?: [x] No    [] Yes (see summary sheet for update)  Subjective functional status/changes:   [] No changes reported  \"This morning is ok so far. Around 2-3 pm I am usually done and then by night time, I am hurting pretty bad. \"    OBJECTIVE  Modality rationale: decrease edema, decrease inflammation and decrease pain to improve the patients ability to improve mobility, work tolerance    Min Type Additional Details    [] Estim: []Att   []Unatt        []TENS instruct                  []IFC  []Premod   []NMES                     []Other:  []w/US   []w/ice   []w/heat  Position:  Location:    []  Traction: [] Cervical       []Lumbar                       [] Prone          []Supine                       []Intermittent   []Continuous Lbs:  [] before manual  [] after manual    []  Ultrasound: []Continuous   [] Pulsed                           []1MHz   []3MHz Location:  W/cm2:    []  Iontophoresis with dexamethasone         Location: [] Take home patch   [] In clinic   10 X vasopneumatic device Position: long sitting  Location: med pressure, low temp to the R foot/ankle   [x] Skin assessment post-treatment:  [x]intact []redness- no adverse reaction       []redness - adverse reaction:       Dry Needling Procedure Note    Dry Needle Session Number:  8    Procedure:    An intramuscular manual therapy (dry needling) and a neuro-muscular re-education treatment was done to deactivate myofascial trigger points, with a 15/30 gauge solid filament needle, under aseptic technique. Indication(s): [x] Muscle spasms [x] Myalgia/Myositis  [] Muscle cramps      [x] Muscle imbalances [] TMD (TMJ) [] Myofascial pain & dysfunction     [] Other: __    Informed Consent Obtained: [x] Verbal  [] Written    The following items were reviewed with the patient:   Purpose of dry needling, side effects, possible complications, and the informed consent    The need to report the use of blood thinners and/or immunosuppressant medications    How to respond to possible adverse effects of the treatment   Self treatment of post needling soreness: heat (moist heat, heat wraps) and stretching   Opportunity was given to ask any questions, all questions were answered    Time Out Performed to verify correct body part and confirm consent for treatment: 6/16/2017 Time: 700am    Patients response to todays treatment:   [x]  LTRs  []  Muscle Relaxation  []  Pain Relief  []  Decreased Tinnitus  []  Decreased HAs [x]  Post needling soreness []  Increased ROM   []  Other:        39 min Therapeutic Exercise:  [x] See flow sheet : 2 units charged sec to mod I   Rationale: increase ROM, increase strength, improve coordination, improve balance and increase proprioception to improve the patients ability to improve gait, mobility, work       18 min Manual Therapy:  IMT : B gastroc head, gastoc soleus  inhibition taping and star formation for retrocalcaneal bursitis     Rationale: decrease pain, increase ROM, increase tissue extensibility and decrease trigger points to improve gait, work, mobility     x min Patient Education: [x] Review HEP  - post IMT care      Other Objective/Functional Measures:    Continued large LTR of both medial and lateral gastroc head    Resumed toe walking but at decreased distance- able to maintain heel height for 1 lap        Pain Level (0-10 scale) post treatment: 3    ASSESSMENT/Changes in Function: Pt still challenged by toe yoga- difficulty recruiting GT flexion. Initiated taping for [address] inhibition post needling - educated on indications and removal as necessary. Patient will continue to benefit from skilled PT services to modify and progress therapeutic interventions, address functional mobility deficits, address ROM deficits, address strength deficits, analyze and address soft tissue restrictions, analyze and cue movement patterns, analyze and modify body mechanics/ergonomics, assess and modify postural abnormalities, address imbalance/dizziness and instruct in home and community integration to attain remaining goals. []  See Plan of Care  [x]  See progress note/recertification 6/69/64  []  See Discharge Summary         Progress towards goals / Updated goals:  Long Term Goals: To be accomplished in 8-12 treatments:  1. Patient will increase FOTO score to 60/100 for indications of increased functional mobility goal progressing - increased 2 pts from IE to 50/100  2. Patient will demo improved gait quality to include proper heel strike and step length for progression to dec compensatory patterns. -Goal progressing - significantly improved from IE as patient is now able to heel strike, but with continued antalgia and decreased comfort  6/14/17  3.  Patient will demo increased AROM DF to 5 deg for progression to normalized gait pattern with community mobility Goal progressing - improved from -1 to +4  6/5/17    PLAN  [x]  Upgrade activities as tolerated     [x]  Continue plan of care  []  Update interventions per flow sheet       []  Discharge due to:_  [x]  Other:_ assess response to taping     Ed Harris DPT  6/16/2017

## 2017-06-21 ENCOUNTER — HOSPITAL ENCOUNTER (OUTPATIENT)
Dept: PHYSICAL THERAPY | Age: 62
Discharge: HOME | End: 2017-06-21
Payer: COMMERCIAL

## 2017-06-21 PROCEDURE — 97140 MANUAL THERAPY 1/> REGIONS: CPT

## 2017-06-21 PROCEDURE — 97110 THERAPEUTIC EXERCISES: CPT

## 2017-06-21 PROCEDURE — 97014 ELECTRIC STIMULATION THERAPY: CPT

## 2017-06-21 NOTE — PROGRESS NOTES
PT DAILY TREATMENT NOTE     Patient Name: Maple Seip  Date:2017  : 1955  [x]  Patient  Verified  Payor: Chris Lowe / Plan: Yony Moya / Product Type: HMO /    In time:800 Out time:904  Total Treatment Time (min): 64  Visit #: 5 of     Treatment Area: Acute foot pain, right [M79.671]    SUBJECTIVE  Pain Level (0-10 scale): 5  Any medication changes, allergies to medications, adverse drug reactions, diagnosis change, or new procedure performed?: [x] No    [] Yes (see summary sheet for update)  Subjective functional status/changes:   [] No changes reported  \"I have had a pretty bad last 4-5 days. I haven't been overly active, just my normal stuff. \"    OBJECTIVE  Modality rationale: decrease edema, decrease inflammation and decrease pain to improve the patients ability to improve mobility, work tolerance    Min Type Additional Details   10 [x] Estim: []Att   []Unatt        []TENS instruct                  []IFC  []Premod   []NMES                     [x]Other:  HV []w/US   []w/ice   [x]w/heat  Position: semi reclined  Location: [address]     []  Traction: [] Cervical       []Lumbar                       [] Prone          []Supine                       []Intermittent   []Continuous Lbs:  [] before manual  [] after manual    []  Ultrasound: []Continuous   [] Pulsed                           []1MHz   []3MHz Location:  W/cm2:    []  Iontophoresis with dexamethasone         Location: [] Take home patch   [] In clinic   H X vasopneumatic device Position: long sitting  Location: med pressure, low temp to the R foot/ankle   [x] Skin assessment post-treatment:  [x]intact []redness- no adverse reaction       []redness - adverse reaction:       Dry Needling Procedure Note    Dry Needle Session Number:  9    Procedure:    An intramuscular manual therapy (dry needling) and a neuro-muscular re-education treatment was done to deactivate myofascial trigger points, with a 15/30 gauge solid filament needle, under aseptic technique. Indication(s): [x] Muscle spasms [x] Myalgia/Myositis  [] Muscle cramps      [x] Muscle imbalances [] TMD (TMJ) [] Myofascial pain & dysfunction     [] Other: __    Informed Consent Obtained: [x] Verbal  [] Written    The following items were reviewed with the patient:   Purpose of dry needling, side effects, possible complications, and the informed consent    The need to report the use of blood thinners and/or immunosuppressant medications    How to respond to possible adverse effects of the treatment   Self treatment of post needling soreness: heat (moist heat, heat wraps) and stretching   Opportunity was given to ask any questions, all questions were answered    Time Out Performed to verify correct body part and confirm consent for treatment: 6/21/2017 Time: 800am    Patients response to todays treatment:   [x]  LTRs  []  Muscle Relaxation  []  Pain Relief  []  Decreased Tinnitus  []  Decreased HAs [x]  Post needling soreness []  Increased ROM   []  Other:        43 min Therapeutic Exercise:  [x] See flow sheet : 2 units charged sec to mod I   Rationale: increase ROM, increase strength, improve coordination, improve balance and increase proprioception to improve the patients ability to improve gait, mobility, work       11 min Manual Therapy:  IMT : B gastroc head, gastoc soleus, roller to [address] and achilles    Rationale: decrease pain, increase ROM, increase tissue extensibility and decrease trigger points to improve gait, work, mobility     x min Patient Education: [x] Review HEP  - post IMT care      Other Objective/Functional Measures:    Decreased time spent on IMT to assess for change in pain levels   Held toe walking again sec to co increased soreness after re initiating last session       Pain Level (0-10 scale) post treatment: 2    ASSESSMENT/Changes in Function: Pt reports taping did not achieve substantial benefits.  Held VASO and initiated HV ES to [address] for m deactivation/ spasm reduction. Patient reports he is still stretching daily. [address] still very responsinve with IMT with large LTR, especially medially. Patient educated on upcoming assessment on Friday - will at min continue with scheduled apts. Patient will continue to benefit from skilled PT services to modify and progress therapeutic interventions, address functional mobility deficits, address ROM deficits, address strength deficits, analyze and address soft tissue restrictions, analyze and cue movement patterns, analyze and modify body mechanics/ergonomics, assess and modify postural abnormalities, address imbalance/dizziness and instruct in home and community integration to attain remaining goals. []  See Plan of Care  [x]  See progress note/recertification 0/95/40  []  See Discharge Summary         Progress towards goals / Updated goals: WILL FORMALLY REASSESS ALL GOALS Friday 6/21/17  Long Term Goals: To be accomplished in 8-12 treatments:  1. Patient will increase FOTO score to 60/100 for indications of increased functional mobility goal progressing - increased 2 pts from IE to 50/100  2. Patient will demo improved gait quality to include proper heel strike and step length for progression to dec compensatory patterns. -Goal progressing - significantly improved from IE as patient is now able to heel strike, but with continued antalgia and decreased comfort  6/14/17  3.  Patient will demo increased AROM DF to 5 deg for progression to normalized gait pattern with community mobility Goal progressing - improved from -1 to +4  6/5/17    PLAN  [x]  Upgrade activities as tolerated     [x]  Continue plan of care  []  Update interventions per flow sheet       []  Discharge due to:_  [x]  Other:_ PN due Friday - 2 more apts scheduled after that     Assess response to ES with mk Wilkes DPT  6/21/2017

## 2017-06-23 ENCOUNTER — HOSPITAL ENCOUNTER (OUTPATIENT)
Dept: PHYSICAL THERAPY | Age: 62
Discharge: HOME | End: 2017-06-23
Payer: COMMERCIAL

## 2017-06-23 PROCEDURE — 97140 MANUAL THERAPY 1/> REGIONS: CPT

## 2017-06-23 PROCEDURE — 97014 ELECTRIC STIMULATION THERAPY: CPT

## 2017-06-23 PROCEDURE — 97110 THERAPEUTIC EXERCISES: CPT

## 2017-06-23 NOTE — PROGRESS NOTES
PT DAILY TREATMENT NOTE     Patient Name: Bonilla Bonilla  Date:2017  : 1955  [x]  Patient  Verified  Payor: Carolina Castor / Plan: Coralyn Pouch / Product Type: HMO /    In time:655 Out time:809  Total Treatment Time (min): 74  Visit #: 6 of     Treatment Area: Acute foot pain, right [M79.671]    SUBJECTIVE  Pain Level (0-10 scale): 3-4  Any medication changes, allergies to medications, adverse drug reactions, diagnosis change, or new procedure performed?: [x] No    [] Yes (see summary sheet for update)  Subjective functional status/changes:   [] No changes reported  \"I still feel like we have made some progress. \"    OBJECTIVE  Modality rationale: decrease edema, decrease inflammation and decrease pain to improve the patients ability to improve mobility, work tolerance    Min Type Additional Details   10 [x] Estim: []Att   []Unatt        []TENS instruct                  []IFC  []Premod   []NMES                     [x]Other:  HV []w/US   []w/ice   [x]w/heat  Position: semi reclined  Location: [address]     []  Traction: [] Cervical       []Lumbar                       [] Prone          []Supine                       []Intermittent   []Continuous Lbs:  [] before manual  [] after manual    []  Ultrasound: []Continuous   [] Pulsed                           []1MHz   []3MHz Location:  W/cm2:    []  Iontophoresis with dexamethasone         Location: [] Take home patch   [] In clinic   H X vasopneumatic device Position: long sitting  Location: med pressure, low temp to the R foot/ankle   [x] Skin assessment post-treatment:  [x]intact []redness- no adverse reaction       []redness - adverse reaction:       Dry Needling Procedure Note    Dry Needle Session Number:  10    Procedure:    An intramuscular manual therapy (dry needling) and a neuro-muscular re-education treatment was done to deactivate myofascial trigger points, with a 15/30 gauge solid filament needle, under aseptic technique.     Indication(s): [x] Muscle spasms [x] Myalgia/Myositis  [] Muscle cramps      [x] Muscle imbalances [] TMD (TMJ) [] Myofascial pain & dysfunction     [] Other: __    Informed Consent Obtained: [x] Verbal  [] Written    The following items were reviewed with the patient:   Purpose of dry needling, side effects, possible complications, and the informed consent    The need to report the use of blood thinners and/or immunosuppressant medications    How to respond to possible adverse effects of the treatment   Self treatment of post needling soreness: heat (moist heat, heat wraps) and stretching   Opportunity was given to ask any questions, all questions were answered    Time Out Performed to verify correct body part and confirm consent for treatment: 6/23/2017 Time: 655    Patients response to todays treatment:   [x]  LTRs  []  Muscle Relaxation  []  Pain Relief  []  Decreased Tinnitus  []  Decreased HAs [x]  Post needling soreness []  Increased ROM   []  Other:        54 min Therapeutic Exercise:  [x] See flow sheet : Reassess, completed FOTO with patient    Rationale: increase ROM, increase strength, improve coordination, improve balance and increase proprioception to improve the patients ability to improve gait, mobility, work     10 min Manual Therapy:  IMT : medial gastroc and soleus, roller to [address] and achilles    Rationale: decrease pain, increase ROM, increase tissue extensibility and decrease trigger points to improve gait, work, mobility     x min Patient Education: [x] Review HEP  - post IMT care      Other Objective/Functional Measures:    Goals assessed for PN  Pain at best 3-4, at worst 9/10  Subjective % improvement 75%  Objective:   AROM DF 11 deg post manual   SLS 10 sec (improved )  Gait : decreased antalgia - pain depending, improved heel strike   Improvements: improved tenderness, increased flexbility and ROM , balance   Deficits barefoot walking, riding passenger with heel propped, ladders     Pain Level (0-10 scale) post treatment: 3    ASSESSMENT/Changes in Function: SEE PN      Patient will continue to benefit from skilled PT services to modify and progress therapeutic interventions, address functional mobility deficits, address ROM deficits, address strength deficits, analyze and address soft tissue restrictions, analyze and cue movement patterns, analyze and modify body mechanics/ergonomics, assess and modify postural abnormalities, address imbalance/dizziness and instruct in home and community integration to attain remaining goals. []  See Plan of Care  [x]  See progress note/recertification 9/61/33  []  See Discharge Summary         Progress towards goals / Updated goals:   Long Term Goals: To be accomplished in 8-12 treatments:  1. Patient will increase FOTO score to 60/100 for indications of increased functional mobility goal progressing slowly - increased to 51/100 (48/100 at IE)  2. Patient will demo improved gait quality to include proper heel strike and step length for progression to dec compensatory patterns. -Goal near met- significantly improved from IE as patient is now able to heel strike, but with continued antalgia and decreased comfort   3.  Patient will demo increased AROM DF to 5 deg for progression to normalized gait pattern with community mobility Goal met at 11 deg     PLAN  [x]  Upgrade activities as tolerated     [x]  Continue plan of care  []  Update interventions per flow sheet       []  Discharge due to:_  [x]  Other:_ See SABINA Serrato DPT  6/23/2017

## 2017-06-23 NOTE — PROGRESS NOTES
[address] [facility] [address] [facility] AT [address]. [place] [address] [place] [address]  Phone: [phone] Fax: [phone]  PROGRESS NOTE  Patient Name: Abdirashid Carmen : 1955   Treatment/Medical Diagnosis: Acute foot pain, right [M79.671]   Referral Source: Megha Weir MD     Date of Initial Visit: 17 Attended Visits: 14 Missed Visits: 0     SUMMARY OF TREATMENT  Patient is being treated for R foot plantar fasciitis. Treatment has included IMT/ dry needling, therex, gait training and anti inflam modalities for pain relief. CURRENT STATUS  Patient continues to make fair progress with overall PT. High activity level with limited rest contributes to patients pain. Patient has switched shoes as recommended and reports continued stretching, however [address] remains consistently tight. IMT/ dry needling has been benefitcial in loosening myofascial tightness, but short term. Other assessment as follows:  Pain at best 3-4, at worst 9/10  Subjective % improvement 75%  Objective:   AROM DF 11 deg post manual   SLS 10 sec (improved )  Gait : decreased antalgia - pain depending, improved heel strike   Improvements: improved tenderness, increased flexbility and ROM , balance   Deficits barefoot walking, riding passenger with heel propped, ladders    Progress towards goals / Updated goals:   Long Term Goals: To be accomplished in 8-12 treatments:  1. Patient will increase FOTO score to 60/100 for indications of increased functional mobility goal progressing slowly - increased to 51/100 (48/100 at IE)  2. Patient will demo improved gait quality to include proper heel strike and step length for progression to dec compensatory patterns. -Goal near met- significantly improved from IE as patient is now able to heel strike, but with continued antalgia and decreased comfort   3.  Patient will demo increased AROM DF to 5 deg for progression to normalized gait pattern with community mobility Goal met at 11 deg     New Goals to be achieved in __2-8__  treatments:  1. Patient will increase FOTO score to 60/100 for indications of increased functional mobility   2. Patient will demo improved gait quality to include proper heel strike and step length for progression to dec compensatory patterns  3. Patient will amb 5 min on TM to progress amb tolerance and improve FOTO question for amb 2 blocks     G-Codes: NA  RECOMMENDATIONS  Patient would benefit from continuation of skilled PT to address above mentioned deficits to progress to increased activity tolerance and QOL. Cont 2 times per week for 8-12 treatments as needed. PATIENT MAY BENEFIT FROM PHONOPHORESIS WITH DEXAMETHASONE - sign if agreeable. If you have any questions/comments please contact us directly at [phone]. Thank you for allowing us to assist in the care of your patient. Therapist Signature: Tanner Peterson PT Date: 6/23/2017     Time: 745am    NOTE TO PHYSICIAN:  PLEASE COMPLETE THE ORDERS BELOW AND FAX TO   [facility]: [phone]. If you are unable to process this request in 24 hours please contact our office: [phone].  ___ I have read the above report and request that my patient continue as recommended.   ___ I have read the above report and request that my patient continue therapy with the following changes/special instructions:_________________________________________________________   ___ I have read the above report and request that my patient be discharged from therapy.      Physician Signature:        Date:       Time:

## 2017-06-28 ENCOUNTER — HOSPITAL ENCOUNTER (OUTPATIENT)
Dept: PHYSICAL THERAPY | Age: 62
Discharge: HOME | End: 2017-06-28
Payer: COMMERCIAL

## 2017-06-28 PROCEDURE — 97140 MANUAL THERAPY 1/> REGIONS: CPT

## 2017-06-28 PROCEDURE — 97014 ELECTRIC STIMULATION THERAPY: CPT

## 2017-06-28 PROCEDURE — 97110 THERAPEUTIC EXERCISES: CPT

## 2017-06-28 NOTE — PROGRESS NOTES
PT DAILY TREATMENT NOTE     Patient Name: Linda Thomas  Date:2017  : 1955  [x]  Patient  Verified  Payor: Umesh Mojica / Plan: Rob Pizarro / Product Type: HMO /    In time:700 Out time:808  Total Treatment Time (min): 68  Visit #: 1 of     Treatment Area: Acute foot pain, right [M79.671]    SUBJECTIVE  Pain Level (0-10 scale): 2  Any medication changes, allergies to medications, adverse drug reactions, diagnosis change, or new procedure performed?: [x] No    [] Yes (see summary sheet for update)  Subjective functional status/changes:   [] No changes reported  \"I have had a good few days and I think I am going to go down to 1 time per week after this week. \"    OBJECTIVE  Modality rationale: decrease edema, decrease inflammation and decrease pain to improve the patients ability to improve mobility, work tolerance    Min Type Additional Details   10 [x] Estim: []Att   []Unatt        []TENS instruct                  []IFC  []Premod   []NMES                     [x]Other:  HV 4 channel  []w/US   []w/ice   [x]w/heat  Position: semi reclined  Location: GSC, medial and lateral     []  Traction: [] Cervical       []Lumbar                       [] Prone          []Supine                       []Intermittent   []Continuous Lbs:  [] before manual  [] after manual    []  Ultrasound: []Continuous   [] Pulsed                           []1MHz   []3MHz Location:  W/cm2:    []  Iontophoresis with dexamethasone         Location: [] Take home patch   [] In clinic   H X vasopneumatic device Position: long sitting  Location: med pressure, low temp to the R foot/ankle   [x] Skin assessment post-treatment:  [x]intact []redness- no adverse reaction       []redness - adverse reaction:       Dry Needling Procedure Note    Dry Needle Session Number:  11    Procedure:    An intramuscular manual therapy (dry needling) and a neuro-muscular re-education treatment was done to deactivate myofascial trigger points, with a 15/30 gauge solid filament needle, under aseptic technique.     Indication(s): [x] Muscle spasms [x] Myalgia/Myositis  [] Muscle cramps      [x] Muscle imbalances [] TMD (TMJ) [] Myofascial pain & dysfunction     [] Other: __    Informed Consent Obtained: [x] Verbal  [] Written    The following items were reviewed with the patient:   Purpose of dry needling, side effects, possible complications, and the informed consent    The need to report the use of blood thinners and/or immunosuppressant medications    How to respond to possible adverse effects of the treatment   Self treatment of post needling soreness: heat (moist heat, heat wraps) and stretching   Opportunity was given to ask any questions, all questions were answered    Time Out Performed to verify correct body part and confirm consent for treatment: 6/28/2017 Time: 700am    Patients response to todays treatment:   [x]  LTRs  []  Muscle Relaxation  []  Pain Relief  []  Decreased Tinnitus  []  Decreased HAs [x]  Post needling soreness []  Increased ROM   []  Other:        47 min Therapeutic Exercise:  [x] See flow sheet : 2 units charged    Rationale: increase ROM, increase strength, improve coordination, improve balance and increase proprioception to improve the patients ability to improve gait, mobility, work     11 min Manual Therapy:  IMT : medial gastroc and soleus, roller to [address] and achilles    Rationale: decrease pain, increase ROM, increase tissue extensibility and decrease trigger points to improve gait, work, mobility     x min Patient Education: [x] Review HEP  - post IMT care      Other Objective/Functional Measures:    Progressed program to challenge balance and dynamic stability for intrinsic foot strength and progression of activity tolerance   Dynamic standing balance - require 1 finger support on 2x4    Exacerbating factor: sitting in recliner with foot relaxed - likely sec to shortening of achilles in PF position     Pain Level (0-10 scale) post treatment: 0    ASSESSMENT/Changes in Function: Patient presents with decreased pain levels and improve gait quality today. Initiated TM training to improve walking tolerance. Patient able to amb at slow pace (1.3 mph) for full 5 min without noted change in gait pattern or reported increase pain. Less reactivity with IMT today, but with continued LTR of medial gastroc. Patient will continue to benefit from skilled PT services to modify and progress therapeutic interventions, address functional mobility deficits, address ROM deficits, address strength deficits, analyze and address soft tissue restrictions, analyze and cue movement patterns, analyze and modify body mechanics/ergonomics, assess and modify postural abnormalities, address imbalance/dizziness and instruct in home and community integration to attain remaining goals. []  See Plan of Care  [x]  See progress note/recertification 4/35/04  []  See Discharge Summary         Progress towards goals / Updated goals: PN complete last session - cont per updated goals 6/28/17  1. Patient will increase FOTO score to 60/100 for indications of increased functional mobility   2. Patient will demo improved gait quality to include proper heel strike and step length for progression to dec compensatory patterns  3.   Patient will amb 5 min on TM to progress amb tolerance and improve FOTO question for amb 2 blocks     PLAN  [x]  Upgrade activities as tolerated     [x]  Continue plan of care  []  Update interventions per flow sheet       []  Discharge due to:_  [x]  Other:_assess response to therex progression     Pushpa Cifuentes DPT  6/28/2017

## 2017-06-30 ENCOUNTER — HOSPITAL ENCOUNTER (OUTPATIENT)
Dept: PHYSICAL THERAPY | Age: 62
Discharge: HOME | End: 2017-06-30
Payer: COMMERCIAL

## 2017-06-30 PROCEDURE — 97110 THERAPEUTIC EXERCISES: CPT

## 2017-06-30 PROCEDURE — 97140 MANUAL THERAPY 1/> REGIONS: CPT

## 2017-06-30 PROCEDURE — 97014 ELECTRIC STIMULATION THERAPY: CPT

## 2017-06-30 NOTE — PROGRESS NOTES
PT DAILY TREATMENT NOTE     Patient Name: Patricio Guthrie  Date:2017  : 1955  [x]  Patient  Verified  Payor: Mary Skip / Plan: Vinny Pete / Product Type: HMO /    In time:7:30  Out time:8:24  Total Treatment Time (min): 54  Total Timed Codes (min): 44  1:1 Treatment Time (min): 44   Visit #: 2 of     Treatment Area: Acute foot pain, right [M79.671]    SUBJECTIVE  Pain Level (0-10 scale): 2-3  Any medication changes, allergies to medications, adverse drug reactions, diagnosis change, or new procedure performed?: [x] No    [] Yes (see summary sheet for update)  Subjective functional status/changes:   [] No changes reported  Overall doing better    OBJECTIVE  Modality rationale: decrease pain and increase tissue extensibility to improve the patients ability to improve mobility    Min Type Additional Details   10 [x] Estim: []Att   []Unatt        []TENS instruct                  []IFC  []Premod   []NMES                     [x]Other:  HV 4 channel []w/US   []w/ice   [x]w/heat  Position: semi-reclined  Location: GSC< medial and lateral     []  Traction: [] Cervical       []Lumbar                       [] Prone          []Supine                       []Intermittent   []Continuous Lbs:  [] before manual  [] after manual   NI []  Ultrasound: [x]Continuous   [] Pulsed with dexamethasone                            [x]1MHz   []3MHz Location: PF, medial heel   W/cm2:     []  Iontophoresis with dexamethasone         Location: [] Take home patch   [] In clinic    []  Ice     []  heat  []  Ice massage Position:  Location:   [] Skin assessment post-treatment:  []intact []redness- no adverse reaction       []redness - adverse reaction:       Dry Needling Procedure Note    Dry Needle Session Number:  12    Procedure:    An intramuscular manual therapy (dry needling) and a neuro-muscular re-education treatment was done to deactivate myofascial trigger points, with a 15/30 gauge solid filament needle, under aseptic technique.     Indication(s): [x] Muscle spasms [x] Myalgia/Myositis  [] Muscle cramps      [x] Muscle imbalances [] TMD (TMJ) [] Myofascial pain & dysfunction     [] Other: __    Informed Consent Obtained: [x] Verbal  [] Written    The following items were reviewed with the patient:   Purpose of dry needling, side effects, possible complications, and the informed consent    The need to report the use of blood thinners and/or immunosuppressant medications    How to respond to possible adverse effects of the treatment   Self treatment of post needling soreness: heat (moist heat, heat wraps) and stretching   Opportunity was given to ask any questions, all questions were answered    Time Out Performed to verify correct body part and confirm consent for treatment: 6/30/2017 Time: 7:45    Treatment:  The following muscles were treated today:      Right: Lateral gastroc, posterior tib (proximal), peroneals (midbelly), abd digiti minimi, abd hallucus, lateral gastroc/soleus    Left:      Patients response to todays treatment:   [x]  LTRs  []  Muscle Relaxation  []  Pain Relief  []  Decreased Tinnitus  []  Decreased HAs [x]  Post needling soreness []  Increased ROM   []  Other:        34 min Therapeutic Exercise:  [x] See flow sheet :   Rationale: increase ROM, increase strength, improve coordination and improve balance to improve the patients ability to improve gait, work      10 min Manual Therapy:  IMT, see above, DTM, stretching, TrP release to lateral gastroc    Rationale: decrease pain, increase ROM, increase tissue extensibility and decrease trigger points to improve gait tolerance, work     x min Patient Education: [x] Review HEP    [] Progressed/Changed HEP based on:   [] positioning   [] body mechanics   [] transfers   [] heat/ice application        Other Objective/Functional Measures:   Did not initiate phonophoresis today due to pt managing pain and increased function well       Pain Level (0-10 scale) post treatment: 0    ASSESSMENT/Changes in Function: no TrP in the medial gastroc. Good response to IMT to the posterior tib and peroneals. Progressing well with balance, ROM, gait improvements. Non-antalgic gait upon leaving session     Patient will continue to benefit from skilled PT services to modify and progress therapeutic interventions, address functional mobility deficits, address ROM deficits, address strength deficits, analyze and address soft tissue restrictions, analyze and cue movement patterns, analyze and modify body mechanics/ergonomics, assess and modify postural abnormalities, address imbalance/dizziness and instruct in home and community integration to attain remaining goals. []  See Plan of Care  []  See progress note/recertification  []  See Discharge Summary         Progress towards goals / Updated goals:  1. Patient will increase FOTO score to 60/100 for indications of increased functional mobility   2. Patient will demo improved gait quality to include proper heel strike and step length for progression to dec compensatory patterns Pt progressing well with more normalized gait (6/30/17)  3.  Patient will amb 5 min on TM to progress amb tolerance and improve FOTO question for amb 2 blocks     PLAN  [x]  Upgrade activities as tolerated     []  Continue plan of care  []  Update interventions per flow sheet       []  Discharge due to:_  [x]  Other:_  Pt ok to utilize phonophoresis with dexamethasone if needed to alleviate pain and inflammation and improve function.    Bekah Newton DPT 6/30/2017  6:40 AM

## 2017-07-07 ENCOUNTER — HOSPITAL ENCOUNTER (OUTPATIENT)
Dept: PHYSICAL THERAPY | Age: 62
End: 2017-07-07
Payer: COMMERCIAL

## 2017-07-10 ENCOUNTER — HOSPITAL ENCOUNTER (OUTPATIENT)
Dept: PHYSICAL THERAPY | Age: 62
Discharge: HOME | End: 2017-07-10
Payer: COMMERCIAL

## 2017-07-10 PROCEDURE — 97110 THERAPEUTIC EXERCISES: CPT

## 2017-07-10 PROCEDURE — 97014 ELECTRIC STIMULATION THERAPY: CPT

## 2017-07-10 PROCEDURE — 97140 MANUAL THERAPY 1/> REGIONS: CPT

## 2017-07-10 NOTE — PROGRESS NOTES
PT DAILY TREATMENT NOTE     Patient Name: Samina Manning  Date:7/10/2017  : 1955  [x]  Patient  Verified  Payor: Eugenio Knutson / Plan: Naman Guzman / Product Type: HMO /    In time:700  Out time:810  Total Treatment Time (min): 70  Visit #: 3 of     Treatment Area: Acute foot pain, right [M79.671]    SUBJECTIVE  Pain Level (0-10 scale): 2  Any medication changes, allergies to medications, adverse drug reactions, diagnosis change, or new procedure performed?: [x] No    [] Yes (see summary sheet for update)  Subjective functional status/changes:   [] No changes reported  \"I have been relatively pain free. I have most pain in the evening and when I sit in the recliner. \"    OBJECTIVE  Modality rationale: decrease pain and increase tissue extensibility to improve the patients ability to improve mobility    Min Type Additional Details   10 [x] Estim: []Att   []Unatt        []TENS instruct                  []IFC  []Premod   []NMES                     [x]Other:  HV 4 channel []w/US   []w/ice   [x]w/heat  Position: semi-reclined  Location: GSC< medial and lateral     []  Traction: [] Cervical       []Lumbar                       [] Prone          []Supine                       []Intermittent   []Continuous Lbs:  [] before manual  [] after manual   NI []  Ultrasound: [x]Continuous   [] Pulsed with dexamethasone                            [x]1MHz   []3MHz Location: PF, medial heel   W/cm2:     []  Iontophoresis with dexamethasone         Location: [] Take home patch   [] In clinic    []  Ice     []  heat  []  Ice massage Position:  Location:   [] Skin assessment post-treatment:  []intact []redness- no adverse reaction       []redness - adverse reaction:       Dry Needling Procedure Note    Dry Needle Session Number:  13    Procedure:    An intramuscular manual therapy (dry needling) and a neuro-muscular re-education treatment was done to deactivate myofascial trigger points, with a 15/30 gauge solid filament needle, under aseptic technique. Indication(s): [x] Muscle spasms [x] Myalgia/Myositis  [] Muscle cramps      [x] Muscle imbalances [] TMD (TMJ) [] Myofascial pain & dysfunction     [] Other: __    Informed Consent Obtained: [x] Verbal  [] Written    The following items were reviewed with the patient:   Purpose of dry needling, side effects, possible complications, and the informed consent    The need to report the use of blood thinners and/or immunosuppressant medications    How to respond to possible adverse effects of the treatment   Self treatment of post needling soreness: heat (moist heat, heat wraps) and stretching   Opportunity was given to ask any questions, all questions were answered    Time Out Performed to verify correct body part and confirm consent for treatment: 7/10/2017 Time: 700    Patients response to todays treatment:   [x]  LTRs  []  Muscle Relaxation  []  Pain Relief  []  Decreased Tinnitus  []  Decreased HAs [x]  Post needling soreness []  Increased ROM   []  Other:        45 min Therapeutic Exercise:  [x] See flow sheet : increased PRE reps - 2 units charged    Rationale: increase ROM, increase strength, improve coordination and improve balance to improve the patients ability to improve gait, work    15 min Manual Therapy:  IMT : medial and lateral gastroc and soleus, peroneus longus , roller to posterior calf and achilles    Rationale: decrease pain, increase ROM, increase tissue extensibility and decrease trigger points to improve gait tolerance, work     x min Patient Education: [x] Review HEP        Other Objective/Functional Measures:    LTG 3- TM 6 min - distance increased , gait deviations : none noted   2x4 balance - improved      Pain Level (0-10 scale) post treatment: 0    ASSESSMENT/Changes in Function: Patient demo increased walking tolerance on TM today and decreased tolerated treatment progression well.   Cont HV ES sec to good response to current protocol with decreased pain levels and increased activity tolerance. Patient will continue to benefit from skilled PT services to modify and progress therapeutic interventions, address functional mobility deficits, address ROM deficits, address strength deficits, analyze and address soft tissue restrictions, analyze and cue movement patterns, analyze and modify body mechanics/ergonomics, assess and modify postural abnormalities, address imbalance/dizziness and instruct in home and community integration to attain remaining goals. []  See Plan of Care  [x]  See progress note/recertification 4/82/39  []  See Discharge Summary         Progress towards goals / Updated goals:  1. Patient will increase FOTO score to 60/100 for indications of increased functional mobility   2.  Patient will demo improved gait quality to include proper heel strike and step length for progression to dec compensatory patterns Pt progressing well with more normalized gait (6/30/17)  3.  Patient will amb 5 min on TM to progress amb tolerance and improve FOTO question for amb 2 blocks - goal met - patient able to amb 6 min on TM 7/10/17     PLAN  [x]  Upgrade activities as tolerated     []  Continue plan of care  []  Update interventions per flow sheet       []  Discharge due to:_  [x]  Other:_  Cont sean 1x per week as scheduled     Everton Barraza DPT  7/10/2017

## 2017-07-12 ENCOUNTER — APPOINTMENT (OUTPATIENT)
Dept: PHYSICAL THERAPY | Age: 62
End: 2017-07-12
Payer: COMMERCIAL

## 2017-07-17 ENCOUNTER — HOSPITAL ENCOUNTER (OUTPATIENT)
Dept: PHYSICAL THERAPY | Age: 62
Discharge: HOME | End: 2017-07-17
Payer: COMMERCIAL

## 2017-07-17 PROCEDURE — 97140 MANUAL THERAPY 1/> REGIONS: CPT

## 2017-07-17 PROCEDURE — 97110 THERAPEUTIC EXERCISES: CPT

## 2017-07-17 PROCEDURE — 97014 ELECTRIC STIMULATION THERAPY: CPT

## 2017-07-21 ENCOUNTER — APPOINTMENT (OUTPATIENT)
Dept: PHYSICAL THERAPY | Age: 62
End: 2017-07-21
Payer: COMMERCIAL

## 2017-07-23 NOTE — PROGRESS NOTES
PT DAILY TREATMENT NOTE     Patient Name: Elias Cueva  Date:2017  : 1955  [x]  Patient  Verified  Payor: [employer] / Plan: Yuri Fragosovero / Product Type: HMO /    In time:657 Out time:800  Total Treatment Time (min): 63  Visit #: 5 of     Treatment Area: Acute foot pain, right [M79.671]    SUBJECTIVE  Pain Level (0-10 scale): 0  Any medication changes, allergies to medications, adverse drug reactions, diagnosis change, or new procedure performed?: [x] No    [] Yes (see summary sheet for update)  Subjective functional status/changes:   [] No changes reported  \"Doing great\"    OBJECTIVE  Modality rationale: decrease pain and increase tissue extensibility to improve the patients ability to improve mobility    Min Type Additional Details   10 [x] Estim: []Att   []Unatt        []TENS instruct                  []IFC  []Premod   []NMES                     [x]Other:  HV 4 channel []w/US   []w/ice   [x]w/heat  Position: semi-reclined  Location: GSC< medial and lateral     []  Traction: [] Cervical       []Lumbar                       [] Prone          []Supine                       []Intermittent   []Continuous Lbs:  [] before manual  [] after manual   NI []  Ultrasound: [x]Continuous   [] Pulsed with dexamethasone                            [x]1MHz   []3MHz Location: PF, medial heel   W/cm2:     []  Iontophoresis with dexamethasone         Location: [] Take home patch   [] In clinic    []  Ice     []  heat  []  Ice massage Position:  Location:   [x] Skin assessment post-treatment:  [x]intact []redness- no adverse reaction       []redness - adverse reaction:       Dry Needling Procedure Note    Dry Needle Session Number:  15    Procedure: An intramuscular manual therapy (dry needling) and a neuro-muscular re-education treatment was done to deactivate myofascial trigger points, with a 15/30 gauge solid filament needle, under aseptic technique.     Indication(s): [x] Muscle spasms [x] Myalgia/Myositis  [] Muscle cramps      [x] Muscle imbalances [] TMD (TMJ) [] Myofascial pain & dysfunction     [] Other: __    Informed Consent Obtained: [x] Verbal  [] Written    The following items were reviewed with the patient:   Purpose of dry needling, side effects, possible complications, and the informed consent    The need to report the use of blood thinners and/or immunosuppressant medications    How to respond to possible adverse effects of the treatment   Self treatment of post needling soreness: heat (moist heat, heat wraps) and stretching   Opportunity was given to ask any questions, all questions were answered    Time Out Performed to verify correct body part and confirm consent for treatment: 7/23/2017 Time: 657    Patients response to todays treatment:   [x]  LTRs  []  Muscle Relaxation  []  Pain Relief  []  Decreased Tinnitus  []  Decreased HAs [x]  Post needling soreness []  Increased ROM   []  Other:        42 min Therapeutic Exercise:  [x] See flow sheet : reassess, completed FOTO with patient while on TM    Rationale: increase ROM, increase strength, improve coordination and improve balance to improve the patients ability to improve gait, work    11 min Manual Therapy:  IMT : medial and lateral gastroc and soleus, roller to posterior calf and achilles    Rationale: decrease pain, increase ROM, increase tissue extensibility and decrease trigger points to improve gait tolerance, work     x min Patient Education: [x] Review HEP        Other Objective/Functional Measures:    Goals assessed for PN   Pain at best 0, at worst 4/10  Subjective % improvement 95%  Objective:     Ankle strength : grossly 5/5    SLS 30 sec without LOB, SL on 2x4 20 sec without LOB    Gait:  significantly improved quality , normalized heel toe and decreased antalgia   Improvements: \"tremendous improvement\" , increased amb tolerance and quality, improved flexibility, climbing boat ladders   Deficits : pain at night, fear avoidance of certain activities such as ladder climb to avoid exacerbation -\" I think I could do it though\"     Pain Level (0-10 scale) post treatment: 0    ASSESSMENT/Changes in Function: SEE PN     Patient will continue to benefit from skilled PT services to modify and progress therapeutic interventions, address functional mobility deficits, address ROM deficits, address strength deficits, analyze and address soft tissue restrictions, analyze and cue movement patterns, analyze and modify body mechanics/ergonomics, assess and modify postural abnormalities, address imbalance/dizziness and instruct in home and community integration to attain remaining goals. []  See Plan of Care  [x]  See progress note/recertification 8/08/63  []  See Discharge Summary         Progress towards goals / Updated goals:  1. Patient will increase FOTO score to 60/100 for indications of increased functional mobility goal met at 74/100   2.  Patient will demo improved gait quality to include proper heel strike and step length for progression to dec compensatory patterns goal met - normalized heel toe and no noted compensatory patterns    3.  Patient will amb 5 min on TM to progress amb tolerance and improve FOTO question for amb 2 blocks - goal met - patient able to amb 6+ min on TM    PLAN  [x]  Upgrade activities as tolerated     []  Continue plan of care  []  Update interventions per flow sheet       []  Discharge due to:_  [x]  Other:_SEE PN for continuation for last scheduled visit - [address] DPT

## 2017-07-23 NOTE — PROGRESS NOTES
[address] [facility] [address] [facility] AT [address] [place] [address] [place]  Phone: [phone] Fax: [phone]  PROGRESS NOTE/ DISCHARGE SUMMARY   Patient Name: Mana Abraham : 1955   Treatment/Medical Diagnosis: Acute foot pain, right [M79.671]   Referral Source: Rod Dougherty MD     Date of Initial Visit: 17 Attended Visits: 19 Missed Visits: 1     SUMMARY OF TREATMENT  Patient is being treated for R foot plantar fasciitis. Treatment has included IMT/ dry needling, therex, gait training and anti inflam modalities for pain relief. CURRENT STATUS  Patient has made significant improvement in the last 3 weeks. Pain levels have decreased and activity tolerance is improved. Assessment as follows:  Pain at best 0, at worst 4/10  Subjective % improvement 95%  Objective: Ankle strength : grossly 5/5                         SLS 30 sec without LOB, SL on 2x4 20 sec without LOB                         Gait:  significantly improved quality , normalized heel toe and decreased antalgia   Improvements: \"tremendous improvement\" , increased amb tolerance and quality, improved flexibility, climbing boat ladders   Deficits : pain at night, fear avoidance of certain activities such as ladder climb to avoid exacerbation -\" I think I could do it though\"  1. Patient will increase FOTO score to 60/100 for indications of increased functional mobility goal met at 74/100   2. Patient will demo improved gait quality to include proper heel strike and step length for progression to dec compensatory patterns goal met - normalized heel toe and no noted compensatory patterns    3.  Patient will amb 5 min on TM to progress amb tolerance and improve FOTO question for amb 2 blocks - goal met - patient able to amb 6+ min on TM  New Goals to be achieved in __1 _  treatments:  1. Patient will be IND with DC HEP for progression to self management.       G-Codes: NA  RECOMMENDATIONS  Patient will cont for 1 more scheduled visit then DC to HEP/ self management. If you have any questions/comments please contact us directly at [phone]. Thank you for allowing us to assist in the care of your patient. Therapist Signature: Carlos Alberto Garcia PT Date: 7/24/17     Time: 730am   NOTE TO PHYSICIAN:  PLEASE COMPLETE THE ORDERS BELOW AND FAX TO   [facility]: [phone]. If you are unable to process this request in 24 hours please contact our office: [phone].  ___ I have read the above report and request that my patient continue as recommended.   ___ I have read the above report and request that my patient continue therapy with the following changes/special instructions:_________________________________________________________   ___ I have read the above report and request that my patient be discharged from therapy.      Physician Signature:        Date:       Time:

## 2017-07-24 ENCOUNTER — HOSPITAL ENCOUNTER (OUTPATIENT)
Dept: PHYSICAL THERAPY | Age: 62
Discharge: HOME | End: 2017-07-24
Payer: COMMERCIAL

## 2017-07-24 PROCEDURE — 97014 ELECTRIC STIMULATION THERAPY: CPT

## 2017-07-24 PROCEDURE — 97110 THERAPEUTIC EXERCISES: CPT

## 2017-07-24 PROCEDURE — 97140 MANUAL THERAPY 1/> REGIONS: CPT

## 2017-07-26 ENCOUNTER — APPOINTMENT (OUTPATIENT)
Dept: PHYSICAL THERAPY | Age: 62
End: 2017-07-26
Payer: COMMERCIAL

## 2017-07-31 ENCOUNTER — HOSPITAL ENCOUNTER (OUTPATIENT)
Dept: PHYSICAL THERAPY | Age: 62
Discharge: HOME | End: 2017-07-31
Payer: COMMERCIAL

## 2017-07-31 PROCEDURE — 97110 THERAPEUTIC EXERCISES: CPT

## 2017-07-31 PROCEDURE — 97014 ELECTRIC STIMULATION THERAPY: CPT

## 2017-07-31 PROCEDURE — 97140 MANUAL THERAPY 1/> REGIONS: CPT

## 2017-07-31 NOTE — PROGRESS NOTES
PT DAILY TREATMENT NOTE     Patient Name: Aria Hahn  Date:2017  : 1955  [x]  Patient  Verified  Payor: Karina Eugene / Plan: Jose Lowery / Product Type: HMO /    In time:652 Out time:747  Total Treatment Time (min): 55  Visit #: 1 of 1    Treatment Area: Acute foot pain, right [M79.671]    SUBJECTIVE  Pain Level (0-10 scale): 0  Any medication changes, allergies to medications, adverse drug reactions, diagnosis change, or new procedure performed?: [x] No    [] Yes (see summary sheet for update)  Subjective functional status/changes:   [] No changes reported  \"Doing good\" no new co     OBJECTIVE  Modality rationale: decrease pain and increase tissue extensibility to improve the patients ability to improve mobility    Min Type Additional Details   10 [x] Estim: []Att   []Unatt        []TENS instruct                  []IFC  []Premod   []NMES                     [x]Other:  HV 4 channel []w/US   []w/ice   [x]w/heat  Position: semi-reclined  Location: GSC< medial and lateral     []  Traction: [] Cervical       []Lumbar                       [] Prone          []Supine                       []Intermittent   []Continuous Lbs:  [] before manual  [] after manual   NI []  Ultrasound: [x]Continuous   [] Pulsed with dexamethasone                            [x]1MHz   []3MHz Location: PF, medial heel   W/cm2:     []  Iontophoresis with dexamethasone         Location: [] Take home patch   [] In clinic    []  Ice     []  heat  []  Ice massage Position:  Location:   [x] Skin assessment post-treatment:  [x]intact []redness- no adverse reaction       []redness - adverse reaction:       Dry Needling Procedure Note    Dry Needle Session Number:  16    Procedure: An intramuscular manual therapy (dry needling) and a neuro-muscular re-education treatment was done to deactivate myofascial trigger points, with a 15/30 gauge solid filament needle, under aseptic technique.     Indication(s): [x] Muscle spasms [x] Myalgia/Myositis  [] Muscle cramps      [x] Muscle imbalances [] TMD (TMJ) [] Myofascial pain & dysfunction     [] Other: __    Informed Consent Obtained: [x] Verbal  [] Written    The following items were reviewed with the patient:   Purpose of dry needling, side effects, possible complications, and the informed consent    The need to report the use of blood thinners and/or immunosuppressant medications    How to respond to possible adverse effects of the treatment   Self treatment of post needling soreness: heat (moist heat, heat wraps) and stretching   Opportunity was given to ask any questions, all questions were answered    Time Out Performed to verify correct body part and confirm consent for treatment: 7/31/2017 Time: 700am    Patients response to todays treatment:   [x]  LTRs  []  Muscle Relaxation  []  Pain Relief  []  Decreased Tinnitus  []  Decreased HAs [x]  Post needling soreness []  Increased ROM   []  Other:        35 min Therapeutic Exercise:  [x] See flow sheet :    Rationale: increase ROM, increase strength, improve coordination and improve balance to improve the patients ability to improve gait, work    10 min Manual Therapy:  IMT : medial and lateral gastroc and soleus, roller to posterior calf and achilles    Rationale: decrease pain, increase ROM, increase tissue extensibility and decrease trigger points to improve gait tolerance, work     x min Patient Education: [x] Review HEP  For DC - patient declined pictures       Other Objective/Functional Measures:    Patient to be DC to self management- pain levels low for 3 consecutive visits and patient comfortable with DC to HEP      Significant improvement in gait, flexibility, myofascial tightness (decreased LTR with IMT) and strength/ stability    Cont post IMT and therex ES for therapeutic effect     Pain Level (0-10 scale) post treatment: 0    ASSESSMENT/Changes in Function:   [x]  See Discharge Summary/ PN sent last week Progress towards goals / Updated goals:  New Goals to be achieved in __1 _  treatments:  1.  Patient will be IND with DC HEP for progression to self management.  - goal met -  HEP updated for DC and patient educated  7/31/17    PLAN      [x]  Discharge due to:_program complete/ goals met/ progression       Omar Ott DPT

## 2018-03-18 ENCOUNTER — HOSPITAL ENCOUNTER (EMERGENCY)
Age: 63
Discharge: HOME | End: 2018-03-18
Attending: FAMILY MEDICINE
Payer: COMMERCIAL

## 2018-03-18 ENCOUNTER — APPOINTMENT (OUTPATIENT)
Dept: GENERAL RADIOLOGY | Age: 63
End: 2018-03-18
Attending: PHYSICIAN ASSISTANT
Payer: COMMERCIAL

## 2018-03-18 VITALS
DIASTOLIC BLOOD PRESSURE: 68 MMHG | HEART RATE: 94 BPM | BODY MASS INDEX: 23.74 KG/M2 | TEMPERATURE: 99.2 F | WEIGHT: 185 LBS | RESPIRATION RATE: 16 BRPM | SYSTOLIC BLOOD PRESSURE: 110 MMHG | OXYGEN SATURATION: 99 % | HEIGHT: 74 IN

## 2018-03-18 DIAGNOSIS — J06.9 ACUTE UPPER RESPIRATORY INFECTION: Primary | ICD-10-CM

## 2018-03-18 DIAGNOSIS — R05.9 COUGH: ICD-10-CM

## 2018-03-18 LAB
ALBUMIN SERPL-MCNC: 3.8 G/DL (ref 3.4–5)
ALBUMIN/GLOB SERPL: 1.1 {RATIO} (ref 0.8–1.7)
ALP SERPL-CCNC: 124 U/L (ref 45–117)
ALT SERPL-CCNC: 29 U/L (ref 16–61)
ANION GAP SERPL CALC-SCNC: 11 MMOL/L (ref 3–18)
AST SERPL-CCNC: 27 U/L (ref 15–37)
BASOPHILS # BLD: 0 K/UL (ref 0–0.06)
BASOPHILS NFR BLD: 0 % (ref 0–2)
BILIRUB SERPL-MCNC: 0.5 MG/DL (ref 0.2–1)
BNP SERPL-MCNC: 36 PG/ML (ref 0–900)
BUN SERPL-MCNC: 19 MG/DL (ref 7–18)
BUN/CREAT SERPL: 17 (ref 12–20)
CALCIUM SERPL-MCNC: 8.5 MG/DL (ref 8.5–10.1)
CHLORIDE SERPL-SCNC: 102 MMOL/L (ref 100–108)
CK MB CFR SERPL CALC: 1.5 % (ref 0–4)
CK MB SERPL-MCNC: 3.3 NG/ML (ref 5–25)
CK SERPL-CCNC: 215 U/L (ref 39–308)
CO2 SERPL-SCNC: 26 MMOL/L (ref 21–32)
CREAT SERPL-MCNC: 1.1 MG/DL (ref 0.6–1.3)
DIFFERENTIAL METHOD BLD: ABNORMAL
EOSINOPHIL # BLD: 0 K/UL (ref 0–0.4)
EOSINOPHIL NFR BLD: 1 % (ref 0–5)
ERYTHROCYTE [DISTWIDTH] IN BLOOD BY AUTOMATED COUNT: 12.5 % (ref 11.6–14.5)
GLOBULIN SER CALC-MCNC: 3.4 G/DL (ref 2–4)
GLUCOSE SERPL-MCNC: 80 MG/DL (ref 74–99)
HCT VFR BLD AUTO: 39.9 % (ref 36–48)
HGB BLD-MCNC: 13.1 G/DL (ref 13–16)
LYMPHOCYTES # BLD: 0.9 K/UL (ref 0.9–3.6)
LYMPHOCYTES NFR BLD: 14 % (ref 21–52)
MCH RBC QN AUTO: 27.3 PG (ref 24–34)
MCHC RBC AUTO-ENTMCNC: 32.8 G/DL (ref 31–37)
MCV RBC AUTO: 83.3 FL (ref 74–97)
MONOCYTES # BLD: 0.5 K/UL (ref 0.05–1.2)
MONOCYTES NFR BLD: 8 % (ref 3–10)
NEUTS SEG # BLD: 5.1 K/UL (ref 1.8–8)
NEUTS SEG NFR BLD: 77 % (ref 40–73)
PLATELET # BLD AUTO: 235 K/UL (ref 135–420)
PMV BLD AUTO: 9.4 FL (ref 9.2–11.8)
POTASSIUM SERPL-SCNC: 4.1 MMOL/L (ref 3.5–5.5)
PROT SERPL-MCNC: 7.2 G/DL (ref 6.4–8.2)
RBC # BLD AUTO: 4.79 M/UL (ref 4.7–5.5)
SODIUM SERPL-SCNC: 139 MMOL/L (ref 136–145)
TROPONIN I SERPL-MCNC: <0.02 NG/ML (ref 0–0.04)
WBC # BLD AUTO: 6.6 K/UL (ref 4.6–13.2)

## 2018-03-18 PROCEDURE — 93005 ELECTROCARDIOGRAM TRACING: CPT

## 2018-03-18 PROCEDURE — 83880 ASSAY OF NATRIURETIC PEPTIDE: CPT

## 2018-03-18 PROCEDURE — 80053 COMPREHEN METABOLIC PANEL: CPT

## 2018-03-18 PROCEDURE — 82553 CREATINE MB FRACTION: CPT

## 2018-03-18 PROCEDURE — 71046 X-RAY EXAM CHEST 2 VIEWS: CPT

## 2018-03-18 PROCEDURE — 96361 HYDRATE IV INFUSION ADD-ON: CPT

## 2018-03-18 PROCEDURE — 96360 HYDRATION IV INFUSION INIT: CPT

## 2018-03-18 PROCEDURE — 85025 COMPLETE CBC W/AUTO DIFF WBC: CPT

## 2018-03-18 PROCEDURE — 74011250636 HC RX REV CODE- 250/636: Performed by: PHYSICIAN ASSISTANT

## 2018-03-18 PROCEDURE — 99283 EMERGENCY DEPT VISIT LOW MDM: CPT

## 2018-03-18 RX ORDER — SIMVASTATIN 20 MG/1
TABLET, FILM COATED ORAL
COMMUNITY

## 2018-03-18 RX ORDER — PSEUDOEPHEDRINE HYDROCHLORIDE 60 MG/1
60 TABLET ORAL
Qty: 20 TAB | Refills: 1 | Status: SHIPPED | OUTPATIENT
Start: 2018-03-18 | End: 2018-03-23

## 2018-03-18 RX ORDER — HYDROCODONE POLISTIREX AND CHLORPHENIRAMINE POLISTIREX 10; 8 MG/5ML; MG/5ML
5 SUSPENSION, EXTENDED RELEASE ORAL
Qty: 115 ML | Refills: 0 | Status: SHIPPED | OUTPATIENT
Start: 2018-03-18

## 2018-03-18 RX ORDER — FLUTICASONE PROPIONATE 50 MCG
2 SPRAY, SUSPENSION (ML) NASAL DAILY
Qty: 1 BOTTLE | Refills: 0 | Status: SHIPPED | OUTPATIENT
Start: 2018-03-18

## 2018-03-18 RX ADMIN — SODIUM CHLORIDE 1000 ML: 900 INJECTION, SOLUTION INTRAVENOUS at 18:09

## 2018-03-18 NOTE — DISCHARGE INSTRUCTIONS
Cough: Care Instructions  Your Care Instructions    A cough is your body's response to something that bothers your throat or airways. Many things can cause a cough. You might cough because of a cold or the flu, bronchitis, or asthma. Smoking, postnasal drip, allergies, and stomach acid that backs up into your throat also can cause coughs. A cough is a symptom, not a disease. Most coughs stop when the cause, such as a cold, goes away. You can take a few steps at home to cough less and feel better. Follow-up care is a key part of your treatment and safety. Be sure to make and go to all appointments, and call your doctor if you are having problems. It's also a good idea to know your test results and keep a list of the medicines you take. How can you care for yourself at home? · Drink lots of water and other fluids. This helps thin the mucus and soothes a dry or sore throat. Honey or lemon juice in hot water or tea may ease a dry cough. · Take cough medicine as directed by your doctor. · Prop up your head on pillows to help you breathe and ease a dry cough. · Try cough drops to soothe a dry or sore throat. Cough drops don't stop a cough. Medicine-flavored cough drops are no better than candy-flavored drops or hard candy. · Do not smoke. Avoid secondhand smoke. If you need help quitting, talk to your doctor about stop-smoking programs and medicines. These can increase your chances of quitting for good. When should you call for help? Call 911 anytime you think you may need emergency care. For example, call if:  ? · You have severe trouble breathing. ?Call your doctor now or seek immediate medical care if:  ? · You cough up blood. ? · You have new or worse trouble breathing. ? · You have a new or higher fever. ? · You have a new rash. ? Watch closely for changes in your health, and be sure to contact your doctor if:  ? · You cough more deeply or more often, especially if you notice more mucus or a change in the color of your mucus. ? · You have new symptoms, such as a sore throat, an earache, or sinus pain. ? · You do not get better as expected. Where can you learn more? Go to http://rosales-karlie.info/. Enter D279 in the search box to learn more about \"Cough: Care Instructions. \"  Current as of: May 12, 2017  Content Version: 11.4  © 8109-4298 [employer]. Care instructions adapted under license by [employer] (which disclaims liability or warranty for this information). If you have questions about a medical condition or this instruction, always ask your healthcare professional. [place] [address] any warranty or liability for your use of this information. Viral Respiratory Infection: Care Instructions  Your Care Instructions    Viruses are very small organisms. They grow in number after they enter your body. There are many types that cause different illnesses, such as colds and the mumps. The symptoms of a viral respiratory infection often start quickly. They include a fever, sore throat, and runny nose. You may also just not feel well. Or you may not want to eat much. Most viral respiratory infections are not serious. They usually get better with time and self-care. Antibiotics are not used to treat a viral infection. That's because antibiotics will not help cure a viral illness. In some cases, antiviral medicine can help your body fight a serious viral infection. Follow-up care is a key part of your treatment and safety. Be sure to make and go to all appointments, and call your doctor if you are having problems. It's also a good idea to know your test results and keep a list of the medicines you take. How can you care for yourself at home? · Rest as much as possible until you feel better. · Be safe with medicines. Take your medicine exactly as prescribed.  Call your doctor if you think you are having a problem with your medicine. You will get more details on the specific medicine your doctor prescribes. · Take an over-the-counter pain medicine, such as acetaminophen (Tylenol), ibuprofen (Advil, Motrin), or naproxen (Aleve), as needed for pain and fever. Read and follow all instructions on the label. Do not give aspirin to anyone younger than 20. It has been linked to Reye syndrome, a serious illness. · Drink plenty of fluids, enough so that your urine is light yellow or clear like water. Hot fluids, such as tea or soup, may help relieve congestion in your nose and throat. If you have kidney, heart, or liver disease and have to limit fluids, talk with your doctor before you increase the amount of fluids you drink. · Try to clear mucus from your lungs by breathing deeply and coughing. · Gargle with warm salt water once an hour. This can help reduce swelling and throat pain. Use 1 teaspoon of salt mixed in 1 cup of warm water. · Do not smoke or allow others to smoke around you. If you need help quitting, talk to your doctor about stop-smoking programs and medicines. These can increase your chances of quitting for good. To avoid spreading the virus  · Cough or sneeze into a tissue. Then throw the tissue away. · If you don't have a tissue, use your hand to cover your cough or sneeze. Then clean your hand. You can also cough into your sleeve. · Wash your hands often. Use soap and warm water. Wash for 15 to 20 seconds each time. · If you don't have soap and water near you, you can clean your hands with alcohol wipes or gel. When should you call for help? Call your doctor now or seek immediate medical care if:  ? · You have a new or higher fever. ? · Your fever lasts more than 48 hours. ? · You have trouble breathing. ? · You have a fever with a stiff neck or a severe headache. ? · You are sensitive to light. ? · You feel very sleepy or confused. ? Watch closely for changes in your health, and be sure to contact your doctor if:  ? · You do not get better as expected. Where can you learn more? Go to http://rosales-karlie.info/. Enter S090 in the search box to learn more about \"Viral Respiratory Infection: Care Instructions. \"  Current as of: May 12, 2017  Content Version: 11.4  © 0325-3734 [employer]. Care instructions adapted under license by [employer] (which disclaims liability or warranty for this information). If you have questions about a medical condition or this instruction, always ask your healthcare professional. [address] any warranty or liability for your use of this information.

## 2018-03-19 NOTE — ED PROVIDER NOTES
HPI Comments: August Mar is a 65 y.o. male with no significant medical history that presents to the Ed with a complaint of cough, congestion, fatigue and PND. Pt states that he coughs all day long but it gets worse when he lies down at night to sleep. Pt states that his sx hit him about 3 days ago and have steadily worsened. He states that he is becoming more fatigued and SOB through daily routine. HE has tried Tylenol with no relief. HE rates his apin as a 6/10 and worse with cough. He denies cardiac hx, NVD, CP, HA fever. Patient is a 65 y.o. male presenting with cough, fatigue, and shortness of breath. Cough   This is a new problem. The current episode started more than 2 days ago. The problem occurs constantly. The cough is productive of sputum. There has been no fever. Associated symptoms include rhinorrhea and shortness of breath. Pertinent negatives include no headaches, no nausea and no vomiting. He has tried nothing for the symptoms. The treatment provided no relief. He is not a smoker. Fatigue   Associated symptoms include shortness of breath. Pertinent negatives include no vomiting, no headaches and no nausea. Shortness of Breath   Associated symptoms include rhinorrhea and cough. Pertinent negatives include no headaches, no vomiting and no abdominal pain. History reviewed. No pertinent past medical history. History reviewed. No pertinent surgical history. History reviewed. No pertinent family history. Social History     Social History    Marital status: SINGLE     Spouse name: N/A    Number of children: N/A    Years of education: N/A     Occupational History    Not on file.      Social History Main Topics    Smoking status: Never Smoker    Smokeless tobacco: Never Used    Alcohol use Not on file    Drug use: Not on file    Sexual activity: Not on file     Other Topics Concern    Not on file     Social History Narrative    No narrative on file ALLERGIES: Other medication    Review of Systems   Constitutional: Positive for fatigue. HENT: Positive for congestion, rhinorrhea and sinus pressure. Respiratory: Positive for cough and shortness of breath. Gastrointestinal: Negative for abdominal pain, diarrhea, nausea and vomiting. Genitourinary: Negative for dysuria. Musculoskeletal: Negative for back pain. Skin: Negative for wound. Neurological: Negative for headaches. All other systems reviewed and are negative. Vitals:    03/18/18 1712 03/18/18 1800   BP: 145/85 110/68   Pulse: 94    Resp: 16    Temp: 99.2 °F (37.3 °C)    SpO2: 98% 99%   Weight: 83.9 kg (185 lb)    Height: 6' 2\" (1.88 m)             Physical Exam   Constitutional: He is oriented to person, place, and time. He appears well-developed and well-nourished. He appears distressed (mildly). HENT:   Head: Normocephalic and atraumatic. Right Ear: Hearing, external ear and ear canal normal. A middle ear effusion is present. Left Ear: Hearing, external ear and ear canal normal. A middle ear effusion is present. Nose: Mucosal edema and rhinorrhea present. Mouth/Throat: Posterior oropharyngeal erythema present. Eyes: Conjunctivae are normal. Pupils are equal, round, and reactive to light. Neck: Normal range of motion. Cardiovascular: Normal rate, regular rhythm and normal heart sounds. Pulmonary/Chest: Effort normal and breath sounds normal. No respiratory distress. He has no wheezes. Musculoskeletal: Normal range of motion. Neurological: He is alert and oriented to person, place, and time. Skin: Skin is warm and dry. Psychiatric: He has a normal mood and affect. His behavior is normal.   Vitals reviewed.        MDM  Number of Diagnoses or Management Options  Acute upper respiratory infection:   Cough:   Diagnosis management comments: Labs Reviewed  CBC WITH AUTOMATED DIFF - Abnormal; Notable for the following:      NEUTROPHILS                   77 (*)                 LYMPHOCYTES                   14 (*)              All other components within normal limits  METABOLIC PANEL, COMPREHENSIVE - Abnormal; Notable for the following:      BUN                           19 (*)                 Alk. phosphatase              124 (*)             All other components within normal limits  NT-PRO BNP  CARDIAC PANEL,(CK, CKMB & TROPONIN)    Discontinue CXR due to delay in treatment. Pro-bnp negative for CHF  Will treat as URI. Pt is to return with worsening- sx fever, vomiting. Impression URI         Amount and/or Complexity of Data Reviewed  Clinical lab tests: ordered and reviewed  Tests in the radiology section of CPT®: ordered    Risk of Complications, Morbidity, and/or Mortality  Presenting problems: moderate  Diagnostic procedures: moderate  Management options: moderate    Patient Progress  Patient progress: stable        ED Course       Procedures           Vitals:  Patient Vitals for the past 12 hrs:   Temp Pulse Resp BP SpO2   03/18/18 1800 - - - 110/68 99 %   03/18/18 1712 99.2 °F (37.3 °C) 94 16 145/85 98 %         Medications ordered:   Medications   sodium chloride 0.9 % bolus infusion 1,000 mL (0 mL IntraVENous IV Completed 3/18/18 1952)         Lab findings:  Recent Results (from the past 12 hour(s))   CBC WITH AUTOMATED DIFF    Collection Time: 03/18/18  5:15 PM   Result Value Ref Range    WBC 6.6 4.6 - 13.2 K/uL    RBC 4.79 4.70 - 5.50 M/uL    HGB 13.1 13.0 - 16.0 g/dL    HCT 39.9 36.0 - 48.0 %    MCV 83.3 74.0 - 97.0 FL    MCH 27.3 24.0 - 34.0 PG    MCHC 32.8 31.0 - 37.0 g/dL    RDW 12.5 11.6 - 14.5 %    PLATELET 122 875 - 600 K/uL    MPV 9.4 9.2 - 11.8 FL    NEUTROPHILS 77 (H) 40 - 73 %    LYMPHOCYTES 14 (L) 21 - 52 %    MONOCYTES 8 3 - 10 %    EOSINOPHILS 1 0 - 5 %    BASOPHILS 0 0 - 2 %    ABS. NEUTROPHILS 5.1 1.8 - 8.0 K/UL    ABS. LYMPHOCYTES 0.9 0.9 - 3.6 K/UL    ABS. MONOCYTES 0.5 0.05 - 1.2 K/UL    ABS.  EOSINOPHILS 0.0 0.0 - 0.4 K/UL ABS. BASOPHILS 0.0 0.0 - 0.06 K/UL    DF AUTOMATED     METABOLIC PANEL, COMPREHENSIVE    Collection Time: 03/18/18  5:15 PM   Result Value Ref Range    Sodium 139 136 - 145 mmol/L    Potassium 4.1 3.5 - 5.5 mmol/L    Chloride 102 100 - 108 mmol/L    CO2 26 21 - 32 mmol/L    Anion gap 11 3.0 - 18 mmol/L    Glucose 80 74 - 99 mg/dL    BUN 19 (H) 7.0 - 18 MG/DL    Creatinine 1.10 0.6 - 1.3 MG/DL    BUN/Creatinine ratio 17 12 - 20      GFR est AA >60 >60 ml/min/1.73m2    GFR est non-AA >60 >60 ml/min/1.73m2    Calcium 8.5 8.5 - 10.1 MG/DL    Bilirubin, total 0.5 0.2 - 1.0 MG/DL    ALT (SGPT) 29 16 - 61 U/L    AST (SGOT) 27 15 - 37 U/L    Alk. phosphatase 124 (H) 45 - 117 U/L    Protein, total 7.2 6.4 - 8.2 g/dL    Albumin 3.8 3.4 - 5.0 g/dL    Globulin 3.4 2.0 - 4.0 g/dL    A-G Ratio 1.1 0.8 - 1.7     NT-PRO BNP    Collection Time: 03/18/18  5:15 PM   Result Value Ref Range    NT pro-BNP 36 0 - 900 PG/ML   CARDIAC PANEL,(CK, CKMB & TROPONIN)    Collection Time: 03/18/18  5:15 PM   Result Value Ref Range     39 - 308 U/L    CK - MB 3.3 <3.6 ng/ml    CK-MB Index 1.5 0.0 - 4.0 %    Troponin-I, Qt. <0.02 0.0 - 0.045 NG/ML   EKG, 12 LEAD, INITIAL    Collection Time: 03/18/18  5:17 PM   Result Value Ref Range    Ventricular Rate 96 BPM    Atrial Rate 96 BPM    P-R Interval 132 ms    QRS Duration 84 ms    Q-T Interval 324 ms    QTC Calculation (Bezet) 409 ms    Calculated P Axis 58 degrees    Calculated R Axis 69 degrees    Calculated T Axis 60 degrees    Diagnosis       Normal sinus rhythm  Normal ECG  No previous ECGs available             X-Ray, CT or other radiology findings or impressions:  XR CHEST PA LAT    (Results Pending)       Progress notes, Consult notes or additional Procedure notes:     Disposition:  Diagnosis:   1. Acute upper respiratory infection    2.  Cough        Disposition: discharge    Follow-up Information     Follow up With Details Comments Contact Info    Ajit Manriquez MD Call As needed, follow up     [facility] EMERGENCY DEPT  If symptoms worsen [address]  [phone]           Discharge Medication List as of 3/18/2018  7:39 PM      START taking these medications    Details   fluticasone (FLONASE) 50 mcg/actuation nasal spray 2 Sprays by Both Nostrils route daily. , Print, Disp-1 Bottle, R-0      pseudoephedrine (SUDAFED) 60 mg tablet Take 1 Tab by mouth every six (6) hours as needed for Congestion for up to 5 days. , Print, Disp-20 Tab, R-1      chlorpheniramine-HYDROcodone (TUSSIONEX PENNKINETIC ER) 10-8 mg/5 mL suspension Take 5 mL by mouth nightly as needed for Cough. Max Daily Amount: 5 mL. , Print, Disp-115 mL, R-0         CONTINUE these medications which have NOT CHANGED    Details   simvastatin (ZOCOR) 20 mg tablet Take  by mouth nightly., Historical Med

## 2018-03-20 LAB
ATRIAL RATE: 96 BPM
CALCULATED P AXIS, ECG09: 58 DEGREES
CALCULATED R AXIS, ECG10: 69 DEGREES
CALCULATED T AXIS, ECG11: 60 DEGREES
DIAGNOSIS, 93000: NORMAL
P-R INTERVAL, ECG05: 132 MS
Q-T INTERVAL, ECG07: 324 MS
QRS DURATION, ECG06: 84 MS
QTC CALCULATION (BEZET), ECG08: 409 MS
VENTRICULAR RATE, ECG03: 96 BPM

## 2024-10-15 NOTE — PROGRESS NOTES
PT DAILY TREATMENT NOTE     Patient Name: Elias Cueva  Date:2017  : 1955  [x]  Patient  Verified  Payor: [employer] / Plan: Yuri Fragosovero / Product Type: HMO /    In time:700 Out time:754  Total Treatment Time (min): 47  Visit #: 4 of     Treatment Area: Acute foot pain, right [M79.671]    SUBJECTIVE  Pain Level (0-10 scale): 0  Any medication changes, allergies to medications, adverse drug reactions, diagnosis change, or new procedure performed?: [x] No    [] Yes (see summary sheet for update)  Subjective functional status/changes:   [] No changes reported  \"Busy weekend, but I am doing well. \"    OBJECTIVE  Modality rationale: decrease pain and increase tissue extensibility to improve the patients ability to improve mobility    Min Type Additional Details   10 [x] Estim: []Att   []Unatt        []TENS instruct                  []IFC  []Premod   []NMES                     [x]Other:  HV 4 channel []w/US   []w/ice   [x]w/heat  Position: semi-reclined  Location: GSC< medial and lateral     []  Traction: [] Cervical       []Lumbar                       [] Prone          []Supine                       []Intermittent   []Continuous Lbs:  [] before manual  [] after manual   NI []  Ultrasound: [x]Continuous   [] Pulsed with dexamethasone                            [x]1MHz   []3MHz Location: PF, medial heel   W/cm2:     []  Iontophoresis with dexamethasone         Location: [] Take home patch   [] In clinic    []  Ice     []  heat  []  Ice massage Position:  Location:   [] Skin assessment post-treatment:  []intact []redness- no adverse reaction       []redness - adverse reaction:       Dry Needling Procedure Note    Dry Needle Session Number:  14    Procedure: An intramuscular manual therapy (dry needling) and a neuro-muscular re-education treatment was done to deactivate myofascial trigger points, with a 15/30 gauge solid filament needle, under aseptic technique.     Indication(s): [x] Muscle spasms [x] Myalgia/Myositis  [] Muscle cramps      [x] Muscle imbalances [] TMD (TMJ) [] Myofascial pain & dysfunction     [] Other: __    Informed Consent Obtained: [x] Verbal  [] Written    The following items were reviewed with the patient:   Purpose of dry needling, side effects, possible complications, and the informed consent    The need to report the use of blood thinners and/or immunosuppressant medications    How to respond to possible adverse effects of the treatment   Self treatment of post needling soreness: heat (moist heat, heat wraps) and stretching   Opportunity was given to ask any questions, all questions were answered    Time Out Performed to verify correct body part and confirm consent for treatment: 7/17/2017 Time: 700    Patients response to todays treatment:   [x]  LTRs  []  Muscle Relaxation  []  Pain Relief  []  Decreased Tinnitus  []  Decreased HAs [x]  Post needling soreness []  Increased ROM   []  Other:        34 min Therapeutic Exercise:  [x] See flow sheet    Rationale: increase ROM, increase strength, improve coordination and improve balance to improve the patients ability to improve gait, work    10 min Manual Therapy:  IMT : medial and lateral gastroc and soleus, roller to posterior calf and achilles    Rationale: decrease pain, increase ROM, increase tissue extensibility and decrease trigger points to improve gait tolerance, work     x min Patient Education: [x] Review HEP        Other Objective/Functional Measures:    LTG 2 - gait : normalized heel toe and no noted compensatory patterns   Improved soleus flexibility as indicated by increased knee flexion on slant board     Pain Level (0-10 scale) post treatment: 0    ASSESSMENT/Changes in Function: Patient presents with 0/10 pain today. Lowest pain since onset of PT. Gait has improved significantly to WNL with no gross deviations.  No significant change to program sec to current program proving effective in decreasing pain levels and increasing function. Patient will continue to benefit from skilled PT services to modify and progress therapeutic interventions, address functional mobility deficits, address ROM deficits, address strength deficits, analyze and address soft tissue restrictions, analyze and cue movement patterns, analyze and modify body mechanics/ergonomics, assess and modify postural abnormalities, address imbalance/dizziness and instruct in home and community integration to attain remaining goals. []  See Plan of Care  [x]  See progress note/recertification 0/37/59  []  See Discharge Summary         Progress towards goals / Updated goals:  1. Patient will increase FOTO score to 60/100 for indications of increased functional mobility   2.  Patient will demo improved gait quality to include proper heel strike and step length for progression to dec compensatory patterns goal met - normalized heel toe and no noted compensatory patterns  7/17/17  3.  Patient will amb 5 min on TM to progress amb tolerance and improve FOTO question for amb 2 blocks - goal met - patient able to amb 6 min on TM 7/10/17     PLAN  [x]  Upgrade activities as tolerated     []  Continue plan of care  []  Update interventions per flow sheet       []  Discharge due to:_  [x]  Other:_  Patient scheduled through [facility] for continuation for final visit      Mariza Saini DPHUNTER  7/17/2017 No